# Patient Record
Sex: FEMALE | Race: ASIAN | NOT HISPANIC OR LATINO | Employment: OTHER | URBAN - METROPOLITAN AREA
[De-identification: names, ages, dates, MRNs, and addresses within clinical notes are randomized per-mention and may not be internally consistent; named-entity substitution may affect disease eponyms.]

---

## 2017-01-10 DIAGNOSIS — I10 ESSENTIAL HYPERTENSION: ICD-10-CM

## 2017-01-10 DIAGNOSIS — E78.6 HDL DEFICIENCY: ICD-10-CM

## 2017-01-10 RX ORDER — HYDROCHLOROTHIAZIDE 25 MG/1
25 TABLET ORAL
Qty: 90 TAB | Refills: 1 | Status: SHIPPED | OUTPATIENT
Start: 2017-01-10 | End: 2017-10-02 | Stop reason: SDUPTHER

## 2017-01-10 RX ORDER — LOVASTATIN 10 MG/1
10 TABLET ORAL DAILY
Qty: 90 TAB | Refills: 0 | Status: SHIPPED | OUTPATIENT
Start: 2017-01-10 | End: 2017-04-10 | Stop reason: SDUPTHER

## 2017-01-18 ENCOUNTER — OFFICE VISIT (OUTPATIENT)
Dept: MEDICAL GROUP | Facility: MEDICAL CENTER | Age: 82
End: 2017-01-18
Payer: MEDICARE

## 2017-01-18 VITALS
HEART RATE: 66 BPM | DIASTOLIC BLOOD PRESSURE: 68 MMHG | SYSTOLIC BLOOD PRESSURE: 142 MMHG | OXYGEN SATURATION: 96 % | BODY MASS INDEX: 29.3 KG/M2 | WEIGHT: 159.2 LBS | TEMPERATURE: 97.7 F | HEIGHT: 62 IN | RESPIRATION RATE: 14 BRPM

## 2017-01-18 DIAGNOSIS — I10 HTN (HYPERTENSION), BENIGN: ICD-10-CM

## 2017-01-18 DIAGNOSIS — E78.2 MIXED HYPERLIPIDEMIA: ICD-10-CM

## 2017-01-18 PROCEDURE — 99213 OFFICE O/P EST LOW 20 MIN: CPT | Performed by: INTERNAL MEDICINE

## 2017-01-18 RX ORDER — AMLODIPINE BESYLATE 5 MG/1
5 TABLET ORAL DAILY
Qty: 90 TAB | Refills: 3 | Status: SHIPPED | OUTPATIENT
Start: 2017-01-18 | End: 2018-02-28 | Stop reason: SDUPTHER

## 2017-01-18 NOTE — MR AVS SNAPSHOT
"        Tracy Baldwin   2017 1:00 PM   Office Visit   MRN: [id number]    Department:  [address] [place] [address]   Dept Phone:  [phone]    Description:  Female : 1935   Provider:  Hector Bah M.D.           Reason for Visit     Follow-Up 3 month check up      Allergies as of 2017     Allergen Noted Reactions    Nkda [No Known Drug Allergy] 2011         You were diagnosed with     HTN (hypertension), benign   [023181]         Vital Signs     Blood Pressure Pulse Temperature Respirations Height Weight    142/68 mmHg 66 36.5 °C (97.7 °F) 14 1.575 m (5' 2\") 72.213 kg (159 lb 3.2 oz)    Body Mass Index Oxygen Saturation Smoking Status             29.11 kg/m2 96% Current Every Day Smoker         Basic Information     Date Of Birth Sex Race Ethnicity Preferred Language    1935 Female  Unknown English      Your appointments     Mar 02, 2017  1:00 PM   NEW TO YOU with Brii Ortiz M.D.   [facility] [address]    [address] [place] [address]  [facility] [address]   [phone]              Problem List              ICD-10-CM Priority Class Noted - Resolved    HTN (hypertension), benign I10   3/14/2011 - Present    Hyperlipidemia E78.5   3/14/2011 - Present    Osteopenia M85.80   3/14/2011 - Present    DJD (degenerative joint disease) M19.90   3/14/2011 - Present    Multiple respiratory allergies J30.9   2014 - Present    Tobacco abuse Z72.0   2014 - Present      Health Maintenance        Date Due Completion Dates    PAP SMEAR 1958 ---    IMM ZOSTER VACCINE 1997 ---    MAMMOGRAM 2015 (Declined), 2012, 10/31/2011    Override on 2014: Patient Declined    IMM INFLUENZA (1) 2016    BONE DENSITY 2017 (Done), 2012    Override on 2012: Done    COLONOSCOPY 2018 (Done)    Override on 2008: Done    IMM DTaP/Tdap/Td Vaccine (2 - Td) 2022            "   Current Immunizations     13-VALENT PCV PREVNAR 4/20/2016    Influenza Vaccine Quad Inj (Preserved) 1/19/2015    Pneumococcal polysaccharide vaccine (PPSV-23) 10/1/2011    Tdap Vaccine 11/14/2012      Below and/or attached are the medications your provider expects you to take. Review all of your home medications and newly ordered medications with your provider and/or pharmacist. Follow medication instructions as directed by your provider and/or pharmacist. Please keep your medication list with you and share with your provider. Update the information when medications are discontinued, doses are changed, or new medications (including over-the-counter products) are added; and carry medication information at all times in the event of emergency situations     Allergies:  NKDA - (reactions not documented)               Medications  Valid as of: January 18, 2017 -  1:34 PM    Generic Name Brand Name Tablet Size Instructions for use    AmLODIPine Besylate (Tab) NORVASC 5 MG Take 1 Tab by mouth every day.        Aspirin (Tablet Delayed Response) aspirin 81 MG Take 81 mg by mouth.        Calcium Carb-Cholecalciferol (Tab) Calcium-Vitamin D 500-400 MG-UNIT Take 1 Tab by mouth 2 Times a Day.        Cetirizine HCl (Cap) Cetirizine HCl 10 MG Take 1 Capsule by mouth every day.        Cholecalciferol (Tab) cholecalciferol 1000 UNIT Take 1,000 Units by mouth every day.        Estradiol (Tab) VAGIFEM 10 MCG Use on tablet daily x2 weeks, then 1 tablet twice a week        Fluticasone Propionate (Suspension) FLONASE 50 MCG/ACT Spray 1 Spray in nose 2 times a day.        HydroCHLOROthiazide (Tab) HYDRODIURIL 25 MG Take 1 Tab by mouth every day. TAKE 1 TAB BY MOUTH EVERY DAY.        Lisinopril (Tab) PRINIVIL, ZESTRIL 40 MG Take 1 Tab by mouth every day. TAKE 1 TAB BY MOUTH EVERY DAY.        Lovastatin (Tab) MEVACOR 10 MG Take 1 Tab by mouth every day.        .                 Medicines prescribed today were sent to:     [facility]  #[id number] - [place], [place] - [address] [facility]    [address] [place] [address]    Phone: [phone] Fax: [phone]    Open 24 Hours?: No      Medication refill instructions:       If your prescription bottle indicates you have medication refills left, it is not necessary to call your provider’s office. Please contact your pharmacy and they will refill your medication.    If your prescription bottle indicates you do not have any refills left, you may request refills at any time through one of the following ways: The online [employer] system (except Urgent Care), by calling your provider’s office, or by asking your pharmacy to contact your provider’s office with a refill request. Medication refills are processed only during regular business hours and may not be available until the next business day. Your provider may request additional information or to have a follow-up visit with you prior to refilling your medication.   *Please Note: Medication refills are assigned a new Rx number when refilled electronically. Your pharmacy may indicate that no refills were authorized even though a new prescription for the same medication is available at the pharmacy. Please request the medicine by name with the pharmacy before contacting your provider for a refill.        Other Notes About Your Plan                  [employer] Access Code: [email or web address]  Expires: 2/17/2017  1:34 PM    [employer]  A secure, online tool to manage your health information     [employer]® is a secure, online tool that connects you to your personalized health information from the privacy of your home -- day or night - making it very easy for you to manage your healthcare. Once the activation process is completed, you can even access your medical information using the Starbucks tiffanie, which is available for free in the Apple Tiffanie store or Google Play store.     [employer] provides the following levels of access (as shown below):   My Chart Features    [facility] Doctor [facility]  Specialists [facility]  Urgent  Care Non-[facility]  Primary Care  Doctor   Email your healthcare team securely and privately 24/7 X X X    Manage appointments: schedule your next appointment; view details of past/upcoming appointments X      Request prescription refills. X      View recent personal medical records, including lab and immunizations X X X X   View health record, including health history, allergies, medications X X X X   Read reports about your outpatient visits, procedures, consult and ER notes X X X X   See your discharge summary, which is a recap of your hospital and/or ER visit that includes your diagnosis, lab results, and care plan. X X       How to register for [facility]  1. Go to  [email or web address].  2. Click on the Sign Up Now box, which takes you to the New Member Sign Up page. You will need to provide the following information:  a. Enter your [facility] Access Code exactly as it appears at the top of this page. (You will not need to use this code after you’ve completed the sign-up process. If you do not sign up before the expiration date, you must request a new code.)   b. Enter your date of birth.   c. Enter your home email address.   d. Click Submit, and follow the next screen’s instructions.  3. Create a [facility] ID. This will be your [facility] login ID and cannot be changed, so think of one that is secure and easy to remember.  4. Create a [facility] password. You can change your password at any time.  5. Enter your Password Reset Question and Answer. This can be used at a later time if you forget your password.   6. Enter your e-mail address. This allows you to receive e-mail notifications when new information is available in [facility]  7. Click Sign Up. You can now view your health information.    For assistance activating your [facility] account, call [phone]

## 2017-01-19 NOTE — PROGRESS NOTES
Tracy is a 81 y.o. female who is here today because    Chief Complaint   Patient presents with   • Follow-Up     3 month check up       The patient's current problem list and medication list is:    Patient Active Problem List    Diagnosis Date Noted   • Multiple respiratory allergies 05/07/2014   • Tobacco abuse 05/07/2014   • HTN (hypertension), benign 03/14/2011   • Hyperlipidemia 03/14/2011   • Osteopenia 03/14/2011   • DJD (degenerative joint disease) 03/14/2011       Current Outpatient Prescriptions   Medication Sig Dispense Refill   • amlodipine (NORVASC) 5 MG Tab Take 1 Tab by mouth every day. 90 Tab 3   • lovastatin (MEVACOR) 10 MG tablet Take 1 Tab by mouth every day. 90 Tab 0   • hydrochlorothiazide (HYDRODIURIL) 25 MG Tab Take 1 Tab by mouth every day. TAKE 1 TAB BY MOUTH EVERY DAY. 90 Tab 1   • lisinopril (PRINIVIL, ZESTRIL) 40 MG tablet Take 1 Tab by mouth every day. TAKE 1 TAB BY MOUTH EVERY DAY. 90 Tab 1   • fluticasone (FLONASE) 50 MCG/ACT nasal spray Spray 1 Spray in nose 2 times a day. 1 Bottle 1   • Cetirizine HCl 10 MG Cap Take 1 Capsule by mouth every day. 30 Cap 2   • estradiol (VAGIFEM) 10 MCG Tab Use on tablet daily x2 weeks, then 1 tablet twice a week 18 Tab 0   • vitamin D (CHOLECALCIFEROL) 1000 UNIT Tab Take 1,000 Units by mouth every day.     • aspirin (ASPIR-LOW) 81 MG EC tablet Take 81 mg by mouth.     • Calcium Carb-Cholecalciferol (CALCIUM-VITAMIN D) 500-400 MG-UNIT TABS Take 1 Tab by mouth 2 Times a Day.       No current facility-administered medications for this visit.     HPI  Tracy returns for 3 month follow up. Diagnoses of HTN (hypertension), benign and Mixed hyperlipidemia were pertinent to this visit.    1. HTN (hypertension), benign  No postural dizziness. No headache complaints. Good energy level. Compliant with medications.     2. Mixed hyperlipidemia  In August her lipid profile was in good range: Tri 94, HDL 50, LDL 87. She walks everywhere but not so much when cold  "and snowy. No chest pain, SOB or ankle edema.       ROS Denies chest pain, shortness of breath, changes in bowel and bladder function, lower extremity edema.    Allergies as of 01/18/2017 - Kevin as Reviewed 01/18/2017   Allergen Reaction Noted   • Nkda [no known drug allergy]  03/14/2011      /68 mmHg  Pulse 66  Temp(Src) 36.5 °C (97.7 °F)  Resp 14  Ht 1.575 m (5' 2\")  Wt 72.213 kg (159 lb 3.2 oz)  BMI 29.11 kg/m2  SpO2 96%    PHYSICAL EXAMINATION  Gen:         Alert and oriented, No apparent distress.  Neck:       No Jugular venous distension, Lymphadenopathy, Thyromegaly, Bruits.  Lungs:     Clear to auscultation bilaterally  CV:          Regular rate and rhythm. No murmur heard  Abd:         Soft, non distended.                     Ext:          No clubbing, cyanosis, edema.      ASSESSMENT AND PLAN     81 y.o. female     1. HTN (hypertension), benign  Adequately controlled with current medication(s); no indication for change.  - amlodipine (NORVASC) 5 MG Tab; Take 1 Tab by mouth every day.  Dispense: 90 Tab; Refill: 3    2. Mixed hyperlipidemia  Adequately controlled with current medication(s); no indication for change.      Followup: 3 months  "

## 2017-03-02 ENCOUNTER — OFFICE VISIT (OUTPATIENT)
Dept: MEDICAL GROUP | Facility: MEDICAL CENTER | Age: 82
End: 2017-03-02
Payer: MEDICARE

## 2017-03-02 VITALS
RESPIRATION RATE: 14 BRPM | HEIGHT: 62 IN | DIASTOLIC BLOOD PRESSURE: 64 MMHG | BODY MASS INDEX: 28.89 KG/M2 | WEIGHT: 157 LBS | TEMPERATURE: 97.8 F | SYSTOLIC BLOOD PRESSURE: 120 MMHG | OXYGEN SATURATION: 95 % | HEART RATE: 67 BPM

## 2017-03-02 DIAGNOSIS — I83.90 VARICOSE VEIN OF LEG: ICD-10-CM

## 2017-03-02 DIAGNOSIS — I10 HTN (HYPERTENSION), BENIGN: ICD-10-CM

## 2017-03-02 DIAGNOSIS — E78.5 HYPERLIPIDEMIA, UNSPECIFIED HYPERLIPIDEMIA TYPE: ICD-10-CM

## 2017-03-02 DIAGNOSIS — Z72.0 TOBACCO ABUSE: ICD-10-CM

## 2017-03-02 DIAGNOSIS — Z00.00 HEALTH CARE MAINTENANCE: ICD-10-CM

## 2017-03-02 DIAGNOSIS — M85.80 OSTEOPENIA: ICD-10-CM

## 2017-03-02 PROCEDURE — G8420 CALC BMI NORM PARAMETERS: HCPCS | Performed by: FAMILY MEDICINE

## 2017-03-02 PROCEDURE — 4004F PT TOBACCO SCREEN RCVD TLK: CPT | Performed by: FAMILY MEDICINE

## 2017-03-02 PROCEDURE — 1101F PT FALLS ASSESS-DOCD LE1/YR: CPT | Performed by: FAMILY MEDICINE

## 2017-03-02 PROCEDURE — G8484 FLU IMMUNIZE NO ADMIN: HCPCS | Performed by: FAMILY MEDICINE

## 2017-03-02 PROCEDURE — 99204 OFFICE O/P NEW MOD 45 MIN: CPT | Mod: 25 | Performed by: FAMILY MEDICINE

## 2017-03-02 PROCEDURE — 4040F PNEUMOC VAC/ADMIN/RCVD: CPT | Performed by: FAMILY MEDICINE

## 2017-03-02 NOTE — MR AVS SNAPSHOT
"        Tracy Denny   3/2/2017 1:00 PM   Office Visit   MRN: [id number]    Department:  [address] [place] [address]   Dept Phone:  [phone]    Description:  Female : 1935   Provider:  Brii Ortiz M.D.           Reason for Visit     Establish Care           Allergies as of 3/2/2017     Allergen Noted Reactions    Nkda [No Known Drug Allergy] 2011         You were diagnosed with     HTN (hypertension), benign   [525616]       Hyperlipidemia, unspecified hyperlipidemia type   [2039944]       Tobacco abuse   [896590]       Osteopenia   [398269]       Health care maintenance   [211881]       Varicose vein of leg   [388990]         Vital Signs     Blood Pressure Pulse Temperature Respirations Height Weight    120/64 mmHg 67 36.6 °C (97.8 °F) 14 1.575 m (5' 2.01\") 71.215 kg (157 lb)    Body Mass Index Oxygen Saturation Smoking Status             28.71 kg/m2 95% Current Every Day Smoker         Basic Information     Date Of Birth Sex Race Ethnicity Preferred Language    1935 Female  Unknown English      Your appointments     2017 11:00 AM   Established Patient with Brii Ortiz M.D.   [facility] [address])    [address] [place] [id number] [phone]           You will be receiving a confirmation call a few days before your appointment from our automated call confirmation system.              Problem List              ICD-10-CM Priority Class Noted - Resolved    HTN (hypertension), benign I10   3/14/2011 - Present    Hyperlipidemia E78.5   3/14/2011 - Present    Osteopenia M85.80   3/14/2011 - Present    DJD (degenerative joint disease) M19.90   3/14/2011 - Present    Multiple respiratory allergies J30.9   2014 - Present    Tobacco abuse Z72.0   2014 - Present      Health Maintenance        Date Due Completion Dates    IMM ZOSTER VACCINE 1997 ---    IMM INFLUENZA (1) 2016    BONE DENSITY 2017 " 11/29/2012 (Done), 11/29/2012    Override on 11/29/2012: Done    COLONOSCOPY 11/14/2018 11/14/2008 (Done)    Override on 11/14/2008: Done    IMM DTaP/Tdap/Td Vaccine (2 - Td) 11/14/2022 11/14/2012            Current Immunizations     13-VALENT PCV PREVNAR 4/20/2016    Influenza Vaccine Quad Inj (Preserved) 1/19/2015    Pneumococcal polysaccharide vaccine (PPSV-23) 10/1/2011    Tdap Vaccine 11/14/2012      Below and/or attached are the medications your provider expects you to take. Review all of your home medications and newly ordered medications with your provider and/or pharmacist. Follow medication instructions as directed by your provider and/or pharmacist. Please keep your medication list with you and share with your provider. Update the information when medications are discontinued, doses are changed, or new medications (including over-the-counter products) are added; and carry medication information at all times in the event of emergency situations     Allergies:  NKDA - (reactions not documented)               Medications  Valid as of: March 02, 2017 -  1:18 PM    Generic Name Brand Name Tablet Size Instructions for use    AmLODIPine Besylate (Tab) NORVASC 5 MG Take 1 Tab by mouth every day.        Aspirin (Tablet Delayed Response) aspirin 81 MG Take 81 mg by mouth.        Calcium Carb-Cholecalciferol (Tab) Calcium-Vitamin D 500-400 MG-UNIT Take 1 Tab by mouth 2 Times a Day.        Cetirizine HCl (Cap) Cetirizine HCl 10 MG Take 1 Capsule by mouth every day.        Cholecalciferol (Tab) cholecalciferol 1000 UNIT Take 1,000 Units by mouth every day.        Fluticasone Propionate (Suspension) FLONASE 50 MCG/ACT Spray 1 Spray in nose 2 times a day.        HydroCHLOROthiazide (Tab) HYDRODIURIL 25 MG Take 1 Tab by mouth every day. TAKE 1 TAB BY MOUTH EVERY DAY.        Lisinopril (Tab) PRINIVIL, ZESTRIL 40 MG Take 1 Tab by mouth every day. TAKE 1 TAB BY MOUTH EVERY DAY.        Lovastatin (Tab) MEVACOR 10 MG Take 1  Tab by mouth every day.        .                 Medicines prescribed today were sent to:     [facility]Y #[id number] - [place], [place] - [address] [facility] [address] [facility]    [address] [facility] [address] [place] [address]    Phone: [phone] Fax: [phone]    Open 24 Hours?: No      Medication refill instructions:       If your prescription bottle indicates you have medication refills left, it is not necessary to call your provider’s office. Please contact your pharmacy and they will refill your medication.    If your prescription bottle indicates you do not have any refills left, you may request refills at any time through one of the following ways: The online [employer] system (except Urgent Care), by calling your provider’s office, or by asking your pharmacy to contact your provider’s office with a refill request. Medication refills are processed only during regular business hours and may not be available until the next business day. Your provider may request additional information or to have a follow-up visit with you prior to refilling your medication.   *Please Note: Medication refills are assigned a new Rx number when refilled electronically. Your pharmacy may indicate that no refills were authorized even though a new prescription for the same medication is available at the pharmacy. Please request the medicine by name with the pharmacy before contacting your provider for a refill.        Other Notes About Your Plan                  [employer] Access Code: [email or web address]  Expires: 4/1/2017  1:18 PM    [employer]  A secure, online tool to manage your health information     [employer]’s [employer]® is a secure, online tool that connects you to your personalized health information from the privacy of your home -- day or night - making it very easy for you to manage your healthcare. Once the activation process is completed, you can even access your medical information using the [employer] tiffanie, which is available for free in the Apple Tiffanie store or  Google Play store.     [employer] provides the following levels of access (as shown below):   My Chart Features   [facility] Doctor [facility]  Specialists [facility]  Urgent  Care Non-[facility]  Primary Care  Doctor   Email your healthcare team securely and privately 24/7 X X X    Manage appointments: schedule your next appointment; view details of past/upcoming appointments X      Request prescription refills. X      View recent personal medical records, including lab and immunizations X X X X   View health record, including health history, allergies, medications X X X X   Read reports about your outpatient visits, procedures, consult and ER notes X X X X   See your discharge summary, which is a recap of your hospital and/or ER visit that includes your diagnosis, lab results, and care plan. X X       How to register for [employer]:  1. Go to  [email or web address].  2. Click on the Sign Up Now box, which takes you to the New Member Sign Up page. You will need to provide the following information:  a. Enter your [employer] Access Code exactly as it appears at the top of this page. (You will not need to use this code after you’ve completed the sign-up process. If you do not sign up before the expiration date, you must request a new code.)   b. Enter your date of birth.   c. Enter your home email address.   d. Click Submit, and follow the next screen’s instructions.  3. Create a [employer] ID. This will be your [employer] login ID and cannot be changed, so think of one that is secure and easy to remember.  4. Create a [employer] password. You can change your password at any time.  5. Enter your Password Reset Question and Answer. This can be used at a later time if you forget your password.   6. Enter your e-mail address. This allows you to receive e-mail notifications when new information is available in [employer].  7. Click Sign Up. You can now view your health information.    For assistance activating your [employer] account, call  [phone]

## 2017-03-02 NOTE — PROGRESS NOTES
CC: Establish a new PCP.    HPI:  Tracy presents today to establish a new PCP. Was a patient of Dr Bah.    Patient lives in a low income senior Citizen housings. Has been active, and independent with all ADLs. Has the following medical issues:    Hypertension, BP has been adequately controlled on current medication. Denies headache, chest pain, and SOB.She is currently on Amlodipine 5 mg, HCTZ 25 mg, and lisinopril 40 mg daily.No side effects.    Hyperlipidemia, she has been tolerating the statin. Denies muscle pain LFTs has been normal, she is currently on Lovastatin 10 mg daily.    She smokes about 5 cig a day for more than 10 yrs, days any breathing problems. Denies any cough, or SOB.No h/o asthma, or COPD. Patient is counseled about smoking cessation, currently not ready to quit, but she will continue cutting down on her smoking gradually.    Last bone density in 2012 showed osteopenia,denies any h/o fratures. Has been using otc vit D and calcium.    Patient has a chronic varicose veins of both legs, she under went a venous striping surgery many ears ago.Has been having chronic leg swelling, using compression socks.Denies lreg pain, and ulcers.    Flu shot, and pneumonia are UTD(had the flu shot this year at [facility])            Patient Active Problem List    Diagnosis Date Noted   • Multiple respiratory allergies 05/07/2014   • Tobacco abuse 05/07/2014   • HTN (hypertension), benign 03/14/2011   • Hyperlipidemia 03/14/2011   • Osteopenia 03/14/2011   • DJD (degenerative joint disease) 03/14/2011       Current Outpatient Prescriptions   Medication Sig Dispense Refill   • amlodipine (NORVASC) 5 MG Tab Take 1 Tab by mouth every day. 90 Tab 3   • lovastatin (MEVACOR) 10 MG tablet Take 1 Tab by mouth every day. 90 Tab 0   • hydrochlorothiazide (HYDRODIURIL) 25 MG Tab Take 1 Tab by mouth every day. TAKE 1 TAB BY MOUTH EVERY DAY. 90 Tab 1   • lisinopril (PRINIVIL, ZESTRIL) 40 MG tablet Take 1 Tab by mouth every day.  "TAKE 1 TAB BY MOUTH EVERY DAY. 90 Tab 1   • fluticasone (FLONASE) 50 MCG/ACT nasal spray Spray 1 Spray in nose 2 times a day. 1 Bottle 1   • Cetirizine HCl 10 MG Cap Take 1 Capsule by mouth every day. 30 Cap 2   • estradiol (VAGIFEM) 10 MCG Tab Use on tablet daily x2 weeks, then 1 tablet twice a week 18 Tab 0   • vitamin D (CHOLECALCIFEROL) 1000 UNIT Tab Take 1,000 Units by mouth every day.     • aspirin (ASPIR-LOW) 81 MG EC tablet Take 81 mg by mouth.     • Calcium Carb-Cholecalciferol (CALCIUM-VITAMIN D) 500-400 MG-UNIT TABS Take 1 Tab by mouth 2 Times a Day.       No current facility-administered medications for this visit.         Allergies as of 03/02/2017 - Kevin as Reviewed 03/02/2017   Allergen Reaction Noted   • Nkda [no known drug allergy]  03/14/2011        Social History     Social History   • Marital Status:      Spouse Name: N/A   • Number of Children: N/A   • Years of Education: N/A     Occupational History   • Not on file.     Social History Main Topics   • Smoking status: Current Every Day Smoker -- 0.25 packs/day for 10 years     Types: Cigarettes   • Smokeless tobacco: Never Used   • Alcohol Use: 0.5 oz/week     1 Glasses of wine per week   • Drug Use: No   • Sexual Activity: Not Currently     Other Topics Concern   • Not on file     Social History Narrative       Family History   Problem Relation Age of Onset   • Hypertension Maternal Grandmother    • Cancer Sister      Colon Cancer       Past Surgical History   Procedure Laterality Date   • Appendectomy     • Abdominal hysterectomy total         ROS:  Denies any Headache, Blurred Vision, Confusion Chest pain,  Shortness of breath,  Abdominal pain, Changes of bowel or bladder, Lower ext edema, Fevers, Nights sweats, Weight Changes, Focal weakness or numbness.  All other systems are negative.    /64 mmHg  Pulse 67  Temp(Src) 36.6 °C (97.8 °F)  Resp 14  Ht 1.575 m (5' 2.01\")  Wt 71.215 kg (157 lb)  BMI 28.71 kg/m2  SpO2 " 95%    Physical Exam:  Gen:         Alert and oriented, No apparent distress.  HEENT:   Perrla, TM clear,  Oralpharynx no erythema or exudates.  Neck:       No Jugular venous distension, Lymphadenopathy, Thyromegaly, Bruits.  Lungs:     Clear to auscultation bilaterally  CV:          Regular rate and rhythm. No murmurs, rubs or gallops.  Abd:         Soft non tender, non distended. Normal active bowel sounds. No hepatosplenomegaly, No pulsatile masses.  Ext:          No clubbing, no cyanosis, mild bilateral leg edema.      Assessment and Plan.   81 y.o. female     1. HTN (hypertension), benign  Has been adequately controlled on current medication. Denies headache, chest pain, and SOB.  Continue on Amlodipine 5 mg, HCTZ 25 mg, and lisinopril 40 mg daily.    2. Hyperlipidemia, unspecified hyperlipidemia type  He has been tolerating the statin. Denies muscle pain LFTs has been normal  Continue on Lovastatin 10 mg daily.    3. Tobacco abuse  Smokes about 5 cig a day for more than 10 yrs, days any breathing problems.   Patient is counseled about smoking cessation, currently not ready.    4. Osteopenia  Last bone density in 2012 showed osteopenia,  Continue on viut D and calcium.    5. Health care maintenance  Flu shot, and pneumonia are UTD(had the flu shot this year at [facility])    6. Varicose vein of leg  Chronic.Had a venous striping surgery many ears ago.Has been having chronic leg swelling, using compression socks.

## 2017-04-10 DIAGNOSIS — E78.6 HDL DEFICIENCY: ICD-10-CM

## 2017-04-10 RX ORDER — LOVASTATIN 10 MG/1
10 TABLET ORAL DAILY
Qty: 90 TAB | Refills: 0 | Status: SHIPPED | OUTPATIENT
Start: 2017-04-10 | End: 2017-07-03 | Stop reason: SDUPTHER

## 2017-06-06 DIAGNOSIS — Z01.810 PRE-OPERATIVE CARDIOVASCULAR EXAMINATION: ICD-10-CM

## 2017-06-06 LAB — EKG IMPRESSION: NORMAL

## 2017-06-06 PROCEDURE — 93005 ELECTROCARDIOGRAM TRACING: CPT

## 2017-06-06 PROCEDURE — 93010 ELECTROCARDIOGRAM REPORT: CPT | Performed by: INTERNAL MEDICINE

## 2017-06-13 ENCOUNTER — HOSPITAL ENCOUNTER (OUTPATIENT)
Facility: MEDICAL CENTER | Age: 82
End: 2017-06-13
Attending: OPHTHALMOLOGY | Admitting: OPHTHALMOLOGY
Payer: MEDICARE

## 2017-06-13 VITALS
RESPIRATION RATE: 16 BRPM | BODY MASS INDEX: 29.09 KG/M2 | HEART RATE: 62 BPM | WEIGHT: 154.1 LBS | TEMPERATURE: 97.6 F | OXYGEN SATURATION: 96 % | HEIGHT: 61 IN

## 2017-06-13 PROBLEM — H26.9 CATARACT: Status: ACTIVE | Noted: 2017-06-13

## 2017-06-13 PROCEDURE — 160002 HCHG RECOVERY MINUTES (STAT): Performed by: OPHTHALMOLOGY

## 2017-06-13 PROCEDURE — 160029 HCHG SURGERY MINUTES - 1ST 30 MINS LEVEL 4: Performed by: OPHTHALMOLOGY

## 2017-06-13 PROCEDURE — 500855 HCHG NEEDLE, IRRIG CYSTOTOME 27G: Performed by: OPHTHALMOLOGY

## 2017-06-13 PROCEDURE — 700111 HCHG RX REV CODE 636 W/ 250 OVERRIDE (IP)

## 2017-06-13 PROCEDURE — 501539 HCHG TIP, PHACO: Performed by: OPHTHALMOLOGY

## 2017-06-13 PROCEDURE — 160035 HCHG PACU - 1ST 60 MINS PHASE I: Performed by: OPHTHALMOLOGY

## 2017-06-13 PROCEDURE — 700101 HCHG RX REV CODE 250

## 2017-06-13 PROCEDURE — 502240 HCHG MISC OR SUPPLY RC 0272: Performed by: OPHTHALMOLOGY

## 2017-06-13 PROCEDURE — 160009 HCHG ANES TIME/MIN: Performed by: OPHTHALMOLOGY

## 2017-06-13 PROCEDURE — 501749 HCHG SHELL REV 276: Performed by: OPHTHALMOLOGY

## 2017-06-13 PROCEDURE — 160048 HCHG OR STATISTICAL LEVEL 1-5: Performed by: OPHTHALMOLOGY

## 2017-06-13 DEVICE — IMPLANTABLE DEVICE: Type: IMPLANTABLE DEVICE | Status: FUNCTIONAL

## 2017-06-13 RX ORDER — SODIUM CHLORIDE, SODIUM LACTATE, POTASSIUM CHLORIDE, CALCIUM CHLORIDE 600; 310; 30; 20 MG/100ML; MG/100ML; MG/100ML; MG/100ML
INJECTION, SOLUTION INTRAVENOUS CONTINUOUS
Status: DISCONTINUED | OUTPATIENT
Start: 2017-06-13 | End: 2017-06-13 | Stop reason: HOSPADM

## 2017-06-13 RX ORDER — KETOROLAC TROMETHAMINE 5 MG/ML
SOLUTION OPHTHALMIC
Status: DISCONTINUED
Start: 2017-06-13 | End: 2017-06-13 | Stop reason: HOSPADM

## 2017-06-13 RX ORDER — TETRACAINE HYDROCHLORIDE 5 MG/ML
SOLUTION OPHTHALMIC
Status: COMPLETED
Start: 2017-06-13 | End: 2017-06-13

## 2017-06-13 RX ORDER — MOXIFLOXACIN 5 MG/ML
SOLUTION/ DROPS OPHTHALMIC
Status: DISCONTINUED
Start: 2017-06-13 | End: 2017-06-13 | Stop reason: HOSPADM

## 2017-06-13 RX ORDER — PHENYLEPHRINE HYDROCHLORIDE 25 MG/ML
SOLUTION/ DROPS OPHTHALMIC
Status: COMPLETED
Start: 2017-06-13 | End: 2017-06-13

## 2017-06-13 RX ORDER — TROPICAMIDE 10 MG/ML
SOLUTION/ DROPS OPHTHALMIC
Status: COMPLETED
Start: 2017-06-13 | End: 2017-06-13

## 2017-06-13 RX ADMIN — TROPICAMIDE 1 DROP: 10 SOLUTION/ DROPS OPHTHALMIC at 12:25

## 2017-06-13 RX ADMIN — TETRACAINE HYDROCHLORIDE 1 DROP: 5 SOLUTION OPHTHALMIC at 12:25

## 2017-06-13 RX ADMIN — PHENYLEPHRINE HYDROCHLORIDE 1 DROP: 2.5 SOLUTION/ DROPS OPHTHALMIC at 12:25

## 2017-06-13 RX ADMIN — SODIUM CHLORIDE, SODIUM LACTATE, POTASSIUM CHLORIDE, CALCIUM CHLORIDE: 600; 310; 30; 20 INJECTION, SOLUTION INTRAVENOUS at 12:40

## 2017-06-13 ASSESSMENT — PAIN SCALES - GENERAL
PAINLEVEL_OUTOF10: 0

## 2017-06-13 NOTE — OR NURSING
1352: To PACU post left eye phaco w/ IOL. Pt is awake and alert. Denies pain or nausea.  1422: Home care instructions reviewed w/ pt and friend. Questions/concerns addressed. Meets criteria for discharge.

## 2017-06-13 NOTE — DISCHARGE INSTRUCTIONS
HOME CARE INSTRUCTIONS FOR CATARACT SURGERY    ACTIVITY: Rest and take it easy for the first 24 hours. We strongly suggest that a responsible adult remain with you during that time. It is normal to feel sleepy. We encourage you to not do anything that requires balance, judgment or coordination. Be extra careful when walking (with a dilated eye, it is easier to trip and fall).     FOR 24 HOURS, DO NOT:       Drive, operate machinery or run household appliances.        Drink beer or alcoholic beverages.        Make important decisions or sign legal documents.     DIET: To avoid nausea, slowly advance diet as tolerated, avoiding spicy or greasy foods for the first meal.     MEDICATIONS: Resume taking daily medication. You may take Tylenol for mild discomfort, if needed.     SURGICAL DRESSING: Eye shield as instructed by your doctor. Dark glasses should be worn while in the sunlight.     Follow your Physician's instruction Sheet. Eye Kit Given    A follow-up appointment is scheduled with your doctor tomorrow at _______ am/pm.     You should call 911 if you develop problems with breathing or chest pain.  You should CALL YOUR PHYSICIAN if you develop: Sharp stabbing pain or sudden change in vision in your operative eye. If you are unable to contact your doctor or the surgical center, you should go to the nearest emergency room or urgent care center.     Physician's telephone # Dr. Carter [phone]    If any questions arise, call your doctor. If your doctor is not available, please feel free to call Same Day Surgery at [phone]. You can also call the [facility] Hotline open 24 hours/day, 7 days/week and speak to a nurse at [phone] or [phone].     I acknowledge receipt and understanding of these Home Care Instructions.    ______________________________     _______________________________            Signature of Patient / Responsible Adult                                                       RN  Signature    A registered nurse may call you a few days after your surgery to see how you are doing.   You may also receive a survey in the mail within the next two weeks and we ask that you take a few moments to complete and return the survey. Our goal is to provide you with very good care and we value your comments. Thank you for choosing [facility].

## 2017-06-13 NOTE — OP REPORT
DATE OF SERVICE:  06/13/2017    DATE OF SURGERY:  06/13/2017.    PREOPERATIVE DIAGNOSIS:  Cataract, left eye.    POSTOPERATIVE DIAGNOSIS:  Cataract, left eye.    PROCEDURE:  Phacoemulsification with intraocular lens implant, left eye.    SURGEON:  Sarkis Carter MD.    ANESTHESIA:  Local MAC.    PROSTHETIC DEVICES:  Softec HD intraocular lens, 21.5 diopter, serial   #[id number].    INDICATIONS:  The patient has a visually significant cataract in the left eye.    DESCRIPTION OF OPERATION:  The patient was placed in the supine position on   the operating room table.  Appropriate anesthetic monitors were positioned.    The eye was prepped and draped in the usual sterile fashion for ocular surgery   using Betadine solution.  A wire lid speculum was positioned for exposure.  A   clear cornea temporal incision was made with a lani keratome and a   paracentesis was made with a lnai knife.  The anterior chamber was filled   with viscoelastic and a continuous curvilinear capsulorrhexis was made with   the capsulorrhexis forceps.  The lens was hydrodissected and the nucleus was   removed using the phacoemulsification handpiece and the cortex was aspirated   with the IA handpiece.  The capsular bag was filled with viscoelastic and the   intraocular lens was positioned into the capsular bag.  Residual viscoelastic   was aspirated from the anterior chamber, and the wound was hydrated with   saline solution producing a watertight closure.  The wire lid speculum was   removed and the patient was taken to the recovery room in stable condition.       ____________________________________     MD CHRISTINA LYN / NTS    DD:  06/13/2017 14:00:25  DT:  06/13/2017 16:37:09    D#:  [id number]  Job#:  [id number]

## 2017-06-13 NOTE — IP AVS SNAPSHOT
" Home Care Instructions                                                                                                                Name:Tracy Baldwin  Medical Record Number:[id number]  CSN: [id number]    YOB: 1935   Age: 81 y.o.  Sex: female  HT:1.549 m (5' 0.98\") WT: 69.9 kg (154 lb 1.6 oz)          Admit Date: 6/13/2017     Discharge Date:   Today's Date: 6/13/2017  Attending Doctor:  Sarkis Carter M.D.                  Allergies:  Nkda                Discharge Instructions       HOME CARE INSTRUCTIONS FOR CATARACT SURGERY    ACTIVITY: Rest and take it easy for the first 24 hours. We strongly suggest that a responsible adult remain with you during that time. It is normal to feel sleepy. We encourage you to not do anything that requires balance, judgment or coordination. Be extra careful when walking (with a dilated eye, it is easier to trip and fall).     FOR 24 HOURS, DO NOT:       Drive, operate machinery or run household appliances.        Drink beer or alcoholic beverages.        Make important decisions or sign legal documents.     DIET: To avoid nausea, slowly advance diet as tolerated, avoiding spicy or greasy foods for the first meal.     MEDICATIONS: Resume taking daily medication. You may take Tylenol for mild discomfort, if needed.     SURGICAL DRESSING: Eye shield as instructed by your doctor. Dark glasses should be worn while in the sunlight.     Follow your Physician's instruction Sheet. Eye Kit Given    A follow-up appointment is scheduled with your doctor tomorrow at _______ am/pm.     You should call 911 if you develop problems with breathing or chest pain.  You should CALL YOUR PHYSICIAN if you develop: Sharp stabbing pain or sudden change in vision in your operative eye. If you are unable to contact your doctor or the surgical center, you should go to the nearest emergency room or urgent care center.     Physician's telephone # Dr. Carter [phone]    If any questions " arise, call your doctor. If your doctor is not available, please feel free to call Same Day Surgery at [phone]. You can also call the Health Hotline open 24 hours/day, 7 days/week and speak to a nurse at [phone] or [phone].     I acknowledge receipt and understanding of these Home Care Instructions.    ______________________________     _______________________________            Signature of Patient / Responsible Adult                                                       RN Signature    A registered nurse may call you a few days after your surgery to see how you are doing.   You may also receive a survey in the mail within the next two weeks and we ask that you take a few moments to complete and return the survey. Our goal is to provide you with very good care and we value your comments. Thank you for choosing [facility].        Medication List      ASK your doctor about these medications        Instructions    Morning Afternoon Evening Bedtime    amlodipine 5 MG Tabs   Commonly known as:  NORVASC        Take 1 Tab by mouth every day.   Dose:  5 mg                        ASPIR-LOW 81 MG EC tablet   Generic drug:  aspirin        Take 81 mg by mouth every day.   Dose:  81 mg                        Cetirizine HCl 10 MG Caps        Take 1 Capsule by mouth every day.   Dose:  1 Capsule                        hydrochlorothiazide 25 MG Tabs   Commonly known as:  HYDRODIURIL        Take 1 Tab by mouth every day. TAKE 1 TAB BY MOUTH EVERY DAY.   Dose:  25 mg                        lisinopril 40 MG tablet   Commonly known as:  PRINIVIL, ZESTRIL        Take 1 Tab by mouth every day. TAKE 1 TAB BY MOUTH EVERY DAY.   Dose:  40 mg                        lovastatin 10 MG tablet   Commonly known as:  MEVACOR        Take 1 Tab by mouth every day.   Dose:  10 mg                                Medication Information     Above and/or attached are the medications your physician expects you to take  upon discharge. Review all of your home medications and newly ordered medications with your doctor and/or pharmacist. Follow medication instructions as directed by your doctor and/or pharmacist. Please keep your medication list with you and share with your physician. Update the information when medications are discontinued, doses are changed, or new medications (including over-the-counter products) are added; and carry medication information at all times in the event of emergency situations.        Resources     Quit Smoking / Tobacco Use:    I understand the use of any tobacco products increases my chance of suffering from future heart disease or stroke and could cause other illnesses which may shorten my life. Quitting the use of tobacco products is the single most important thing I can do to improve my health. For further information on smoking / tobacco cessation call a Toll Free Quit Line at 1-197.463.9097 (*[employer]) or 1-397.347.6049 (American [employer] Association) or you can access the web based program at www.[facility].org.    [place] Tobacco Users Help Line:  (880) 949-6137       Toll Free: 1-417.422.2831  Quit Tobacco Program [facility] (715)775-3736    Crisis Hotline:    [place] Crisis Hotline:  6-429-FNQOPLJ or 1-479.148.5952    [place] Crisis Hotline:    1-648.241.4507 or 372-082-2650    Discharge Survey:   Thank you for choosing [facility]. We hope we did everything we could to make your hospital stay a pleasant one. You may be receiving a survey and we would appreciate your time and participation in answering the questions. Your input is very valuable to us in our efforts to improve our service to our patients and their families.            Signatures     My signature on this form indicates that:    1. I acknowledge receipt and understanding of these Home Care Instruction.  2. My questions regarding this information have been answered to my satisfaction.  3. I have  formulated a plan with my discharge nurse to obtain my prescribed medications for home.    __________________________________      __________________________________                   Patient Signature                                 Guardian/Responsible Adult Signature      __________________________________                 __________       ________                       Nurse Signature                                               Date                 Time

## 2017-06-13 NOTE — IP AVS SNAPSHOT
6/13/2017    Tracy Baldwin  [address]  [place] [address]    Dear Tracy:    [facility] wants to ensure your discharge home is safe and you or your loved ones have had all of your questions answered regarding your care after you leave the hospital.    Below is a list of resources and contact information should you have any questions regarding your hospital stay, follow-up instructions, or active medical symptoms.    Questions or Concerns Regarding… Contact   Medical Questions Related to Your Discharge  (7 days a week, 8am-5pm) Contact a Nurse Care Coordinator   [phone]   Medical Questions Not Related to Your Discharge  (24 hours a day / 7 days a week)  Contact the Nurse Health Line   [phone]    Medications or Discharge Instructions Refer to your discharge packet   or contact your [facility] Primary Care Provider   [phone]   Follow-up Appointment(s) Schedule your appointment via [employer]   or contact Scheduling [phone]   Billing Review your statement via [employer]  or contact Billing [phone]   Medical Records Review your records via [employer]   or contact Medical Records [phone]     You may receive a telephone call within two days of discharge. This call is to make certain you understand your discharge instructions and have the opportunity to have any questions answered. You can also easily access your medical information, test results and upcoming appointments via the [employer] free online health management tool. You can learn more and sign up at [facility]/[employer]. For assistance setting up your [employer] account, please call [phone].    Once again, we want to ensure your discharge home is safe and that you have a clear understanding of any next steps in your care. If you have any questions or concerns, please do not hesitate to contact us, we are here for you. Thank you for choosing [facility] for your healthcare needs.    Sincerely,    Your [facility] Healthcare Team

## 2017-07-03 RX ORDER — LOVASTATIN 10 MG/1
TABLET ORAL
Qty: 90 TAB | Refills: 0 | Status: SHIPPED | OUTPATIENT
Start: 2017-07-03 | End: 2017-10-02 | Stop reason: SDUPTHER

## 2017-07-26 ENCOUNTER — OFFICE VISIT (OUTPATIENT)
Dept: MEDICAL GROUP | Facility: MEDICAL CENTER | Age: 82
End: 2017-07-26
Payer: MEDICARE

## 2017-07-26 ENCOUNTER — HOSPITAL ENCOUNTER (OUTPATIENT)
Dept: RADIOLOGY | Facility: MEDICAL CENTER | Age: 82
End: 2017-07-26
Attending: FAMILY MEDICINE
Payer: MEDICARE

## 2017-07-26 ENCOUNTER — HOSPITAL ENCOUNTER (OUTPATIENT)
Dept: LAB | Facility: MEDICAL CENTER | Age: 82
End: 2017-07-26
Attending: FAMILY MEDICINE
Payer: MEDICARE

## 2017-07-26 VITALS
WEIGHT: 151.24 LBS | RESPIRATION RATE: 14 BRPM | OXYGEN SATURATION: 96 % | TEMPERATURE: 98.2 F | HEIGHT: 61 IN | HEART RATE: 66 BPM | DIASTOLIC BLOOD PRESSURE: 62 MMHG | SYSTOLIC BLOOD PRESSURE: 118 MMHG | BODY MASS INDEX: 28.55 KG/M2

## 2017-07-26 DIAGNOSIS — M25.551 BILATERAL HIP PAIN: ICD-10-CM

## 2017-07-26 DIAGNOSIS — Z72.0 TOBACCO ABUSE: ICD-10-CM

## 2017-07-26 DIAGNOSIS — E78.5 HYPERLIPIDEMIA, UNSPECIFIED HYPERLIPIDEMIA TYPE: ICD-10-CM

## 2017-07-26 DIAGNOSIS — M25.552 BILATERAL HIP PAIN: ICD-10-CM

## 2017-07-26 DIAGNOSIS — I10 HTN (HYPERTENSION), BENIGN: ICD-10-CM

## 2017-07-26 DIAGNOSIS — M85.80 OSTEOPENIA, UNSPECIFIED LOCATION: ICD-10-CM

## 2017-07-26 LAB
ALBUMIN SERPL BCP-MCNC: 3.9 G/DL (ref 3.2–4.9)
ALBUMIN/GLOB SERPL: 1 G/DL
ALP SERPL-CCNC: 68 U/L (ref 30–99)
ALT SERPL-CCNC: 9 U/L (ref 2–50)
ANION GAP SERPL CALC-SCNC: 7 MMOL/L (ref 0–11.9)
AST SERPL-CCNC: 13 U/L (ref 12–45)
BASOPHILS # BLD AUTO: 0.5 % (ref 0–1.8)
BASOPHILS # BLD: 0.04 K/UL (ref 0–0.12)
BILIRUB SERPL-MCNC: 0.4 MG/DL (ref 0.1–1.5)
BUN SERPL-MCNC: 25 MG/DL (ref 8–22)
CALCIUM SERPL-MCNC: 9.7 MG/DL (ref 8.5–10.5)
CHLORIDE SERPL-SCNC: 105 MMOL/L (ref 96–112)
CHOLEST SERPL-MCNC: 174 MG/DL (ref 100–199)
CO2 SERPL-SCNC: 23 MMOL/L (ref 20–33)
CREAT SERPL-MCNC: 0.95 MG/DL (ref 0.5–1.4)
EOSINOPHIL # BLD AUTO: 0.23 K/UL (ref 0–0.51)
EOSINOPHIL NFR BLD: 3.1 % (ref 0–6.9)
ERYTHROCYTE [DISTWIDTH] IN BLOOD BY AUTOMATED COUNT: 44.5 FL (ref 35.9–50)
GFR SERPL CREATININE-BSD FRML MDRD: 57 ML/MIN/1.73 M 2
GLOBULIN SER CALC-MCNC: 4.1 G/DL (ref 1.9–3.5)
GLUCOSE SERPL-MCNC: 105 MG/DL (ref 65–99)
HCT VFR BLD AUTO: 38.5 % (ref 37–47)
HDLC SERPL-MCNC: 54 MG/DL
HGB BLD-MCNC: 12.5 G/DL (ref 12–16)
IMM GRANULOCYTES # BLD AUTO: 0.02 K/UL (ref 0–0.11)
IMM GRANULOCYTES NFR BLD AUTO: 0.3 % (ref 0–0.9)
LDLC SERPL CALC-MCNC: 94 MG/DL
LYMPHOCYTES # BLD AUTO: 2.17 K/UL (ref 1–4.8)
LYMPHOCYTES NFR BLD: 29.1 % (ref 22–41)
MCH RBC QN AUTO: 32.4 PG (ref 27–33)
MCHC RBC AUTO-ENTMCNC: 32.5 G/DL (ref 33.6–35)
MCV RBC AUTO: 99.7 FL (ref 81.4–97.8)
MONOCYTES # BLD AUTO: 0.46 K/UL (ref 0–0.85)
MONOCYTES NFR BLD AUTO: 6.2 % (ref 0–13.4)
NEUTROPHILS # BLD AUTO: 4.53 K/UL (ref 2–7.15)
NEUTROPHILS NFR BLD: 60.8 % (ref 44–72)
NRBC # BLD AUTO: 0 K/UL
NRBC BLD AUTO-RTO: 0 /100 WBC
PLATELET # BLD AUTO: 244 K/UL (ref 164–446)
PMV BLD AUTO: 9.9 FL (ref 9–12.9)
POTASSIUM SERPL-SCNC: 4.3 MMOL/L (ref 3.6–5.5)
PROT SERPL-MCNC: 8 G/DL (ref 6–8.2)
RBC # BLD AUTO: 3.86 M/UL (ref 4.2–5.4)
SODIUM SERPL-SCNC: 135 MMOL/L (ref 135–145)
TRIGL SERPL-MCNC: 128 MG/DL (ref 0–149)
TSH SERPL DL<=0.005 MIU/L-ACNC: 2.6 UIU/ML (ref 0.3–3.7)
WBC # BLD AUTO: 7.5 K/UL (ref 4.8–10.8)

## 2017-07-26 PROCEDURE — 99214 OFFICE O/P EST MOD 30 MIN: CPT | Performed by: FAMILY MEDICINE

## 2017-07-26 PROCEDURE — 73521 X-RAY EXAM HIPS BI 2 VIEWS: CPT

## 2017-07-26 NOTE — PROGRESS NOTES
CC: HTN, HLD, others.    HPI:   Tracy presents today multiple medical issues:    Hypertension, BP has been adequately controlled on current medication. Denies headache, chest pain, and SOB.She is currently on Amlodipine 5 mg, HCTZ 25 mg, and lisinopril 40 mg daily.No side effects.    Hyperlipidemia, she has been tolerating the statin. Denies muscle pain LFTs has been normal, she is currently on Lovastatin 10 mg daily.    She smokes about 4-5 cig a day for more than 10 yrs, denies cough and SOB, or any breathing problems. No h/o asthma, or COPD. Patient is counseled about smoking cessation, currently not ready to quit.    Has had bone density in 2012 showed osteopenia,denies any h/o fratures. Has been using otc vit D and calcium.    Patient has been having bilateral hip pain that started about 2 month, more when she walks, denies any h/o trauma or falling down, on back pain or leg pain.    Patient Active Problem List    Diagnosis Date Noted   • Cataract 06/13/2017   • Multiple respiratory allergies 05/07/2014   • Tobacco abuse 05/07/2014   • HTN (hypertension), benign 03/14/2011   • Hyperlipidemia 03/14/2011   • Osteopenia 03/14/2011   • DJD (degenerative joint disease) 03/14/2011       Current Outpatient Prescriptions   Medication Sig Dispense Refill   • lovastatin (MEVACOR) 10 MG tablet TAKE 1 TAB BY MOUTH EVERY DAY. 90 Tab 0   • amlodipine (NORVASC) 5 MG Tab Take 1 Tab by mouth every day. 90 Tab 3   • hydrochlorothiazide (HYDRODIURIL) 25 MG Tab Take 1 Tab by mouth every day. TAKE 1 TAB BY MOUTH EVERY DAY. 90 Tab 1   • lisinopril (PRINIVIL, ZESTRIL) 40 MG tablet Take 1 Tab by mouth every day. TAKE 1 TAB BY MOUTH EVERY DAY. 90 Tab 1   • aspirin (ASPIR-LOW) 81 MG EC tablet Take 81 mg by mouth every day.     • Cetirizine HCl 10 MG Cap Take 1 Capsule by mouth every day. 30 Cap 2     No current facility-administered medications for this visit.         Allergies as of 07/26/2017 - Kevin as Reviewed 07/26/2017   Allergen  "Reaction Noted   • Nkda [no known drug allergy]  03/14/2011        ROS: Denies any chest pain, Shortness of breath, Changes bowel or bladder, Lower extremity edema.    Physical Exam:  /62 mmHg  Pulse 66  Temp(Src) 36.8 °C (98.2 °F)  Resp 14  Ht 1.549 m (5' 0.98\")  Wt 68.6 kg (151 lb 3.8 oz)  BMI 28.59 kg/m2  SpO2 96%  Gen.: Well-developed, well-nourished, no apparent distress,pleasant and cooperative with the examination  Skin:  Warm and dry with good turgor. No rashes or suspicious lesions in visible areas  HEENT:Sinuses nontender with palpation, TMs clear, nares patent with pink mucosa and clear rhinorrhea,no septal deviation ,polyps or lesions. lips without lesions, oropharynx clear.  Neck: Trachea midline,no masses or adenopathy. No JVD.  Cor: Regular rate and rhythm without murmur, gallop or rub.  Lungs: Respirations unlabored.Clear to auscultation with equal breath sounds bilaterally. No wheezes, rhonchi.  Extremities: No cyanosis, clubbing or edema    Assessment and Plan.   81 y.o. female     1. HTN (hypertension), benign  Has been adequately controlled on current medication. Denies headache, chest pain, and SOB.  Continue on Amlodipine 5 mg, HCTZ 25 mg, and lisinopril 40 mg daily.    - CBC WITH DIFFERENTIAL; Future  - COMP METABOLIC PANEL; Future  - TSH; Future    2. Hyperlipidemia, unspecified hyperlipidemia type  He has been tolerating the statin. Denies muscle pain LFTs has been normal  Continue on Lovastatin 10 mg daily.    - LIPID PANEL  - TSH; Future    3. Osteopenia, unspecified location  Last bone density in 2012 showed osteopenia,will continue follow up the bone density.  Continue on viut D and calcium.    4. Tobacco abuse  Smokes about 5 cig a day for more than 10 yrs, days any breathing problems.    Patient is counseled about smoking cessation, currently not ready.    5. Bilateral hip pain  Probably osteoarthritis.   Continue on otc pain medication.  Will do an X ray for both hip " joints.    - DX-HIP-UNILATERAL-W/O PELVIS-2/3 VIEWS LEFT; Future  - DX-HIP-UNILATERAL-W/O PELVIS-2/3 VIEWS RIGHT; Future

## 2017-08-17 ENCOUNTER — PATIENT OUTREACH (OUTPATIENT)
Dept: HEALTH INFORMATION MANAGEMENT | Facility: OTHER | Age: 82
End: 2017-08-17

## 2017-08-17 NOTE — PROGRESS NOTES
Outcome: No Answer    WebIZ Checked & Epic Updated:  yes    [facility]Connect Verified: yes    Attempt # 1

## 2017-08-24 NOTE — PROGRESS NOTES
Attempt #:3    WebIZ Checked & Epic Updated: yes  HealthConnect Verified: yes  Verify PCP: yes    Communication Preference Obtained: yes     Review Care Team: yes    Annual Wellness Visit Scheduling  1. Scheduling Status:Scheduled       Care Gap Scheduling (Attempt to Schedule EACH Overdue Care Gap!)     Health Maintenance Due   Topic Date Due   • Annual Wellness Visit  Scheduled    • IMM INFLUENZA (1) Not available in office yet          [employer] Activation: sent activation code  [employer] Tiffanie: no  Virtual Visits: no  Opt In to Text Messages: no

## 2017-08-25 ENCOUNTER — TELEPHONE (OUTPATIENT)
Dept: MEDICAL GROUP | Facility: MEDICAL CENTER | Age: 82
End: 2017-08-25

## 2017-08-25 NOTE — TELEPHONE ENCOUNTER
Left message for patient to call back regarding AW[facility] pre-visit planning. Please transfer call to [phone].

## 2017-08-25 NOTE — TELEPHONE ENCOUNTER
Future Appointments       Provider Department Center    8/29/2017 11:00 AM Brii Ortiz M.D.; [place] [address] [place] [facility]    10/26/2017 10:20 AM Brii Ortiz M.D. [address] [place] [facility]          ANNUAL WELLNESS VISIT PRE-VISIT PLANNING     1.  Reviewed note from last office visit with PCP: YES Last office visit: 07/26/17    2.  If any orders were placed at last visit, do we have Results/Consult Notes?        •  Labs - Labs ordered, completed and results are in chart. 07/26/17       •  Imaging - Imaging ordered, completed and results are in chart. 07/26/17 DX Hip Bilat       •  Referrals - No referrals were ordered at last office visit.     3.  Immunizations were updated in [employer] using WebIZ?: Yes       •  WebIZ Recommendations:Patient is up to date on all vaccines       •  Is patient due for Tdap? NO       •  Is patient due for Shingles? NO     4.  Patient is due for the following Health Maintenance Topics:   Health Maintenance Due   Topic Date Due   • Annual Wellness Visit  SCHEDULED FOR 08/29/17   • IMM INFLUENZA (1) 09/01/2017       5.  Reviewed/Updated the following with patient:       •   Preferred Pharmacy? YES       •   Preferred Lab? YES       •   Medications? YES. Was Abstract Encounter opened and chart updated? YES       •   Social History? YES. Was Abstract Encounter opened and chart updated? YES       •   Family History? YES. Was Abstract Encounter opened and chart updated? YES    6.  Care Team Updated:       •   DME Company (gait device, O2, CPAP, etc.): YES       •   Other Specialists (eye doctor, derm, GYN, cardiology, endo, etc): YES       •   Last eye exam: 05/26/17    7. DPA/Advanced Directive:  Patient has Advanced Directive on file.     8.  Patient has the following Care Path diagnoses on Problem List:  NONE    9.  Specialty Comments was updated with diagnosis information provided by [facility]: YES no note    10.  Patient was advised: “This  is a free wellness visit. The provider will screen for medical conditions to help you stay healthy. If you have other concerns to address you may be asked to discuss these at a separate visit or there may be an additional fee.”     11.  Patient was informed to arrive 15 min prior to their scheduled appointment and bring in their medication bottles?  YES

## 2017-08-29 ENCOUNTER — OFFICE VISIT (OUTPATIENT)
Dept: MEDICAL GROUP | Facility: MEDICAL CENTER | Age: 82
End: 2017-08-29
Payer: MEDICARE

## 2017-08-29 VITALS
OXYGEN SATURATION: 95 % | HEART RATE: 64 BPM | DIASTOLIC BLOOD PRESSURE: 60 MMHG | TEMPERATURE: 98.5 F | WEIGHT: 152 LBS | SYSTOLIC BLOOD PRESSURE: 110 MMHG | BODY MASS INDEX: 28.7 KG/M2 | HEIGHT: 61 IN

## 2017-08-29 DIAGNOSIS — E78.5 HYPERLIPIDEMIA, UNSPECIFIED HYPERLIPIDEMIA TYPE: ICD-10-CM

## 2017-08-29 DIAGNOSIS — Z72.0 TOBACCO ABUSE: ICD-10-CM

## 2017-08-29 DIAGNOSIS — I10 HTN (HYPERTENSION), BENIGN: ICD-10-CM

## 2017-08-29 DIAGNOSIS — M85.80 OSTEOPENIA, UNSPECIFIED LOCATION: ICD-10-CM

## 2017-08-29 DIAGNOSIS — Z00.00 HEALTH CARE MAINTENANCE: ICD-10-CM

## 2017-08-29 DIAGNOSIS — J30.9 MULTIPLE RESPIRATORY ALLERGIES: ICD-10-CM

## 2017-08-29 PROCEDURE — G0439 PPPS, SUBSEQ VISIT: HCPCS | Performed by: FAMILY MEDICINE

## 2017-08-29 ASSESSMENT — PATIENT HEALTH QUESTIONNAIRE - PHQ9: CLINICAL INTERPRETATION OF PHQ2 SCORE: 0

## 2017-08-29 NOTE — PROGRESS NOTES
Chief Complaint   Patient presents with   • Annual Exam     AWV         HPI:  Tracy is a 82 y.o. here for Medicare Annual Wellness Visit        Patient Active Problem List    Diagnosis Date Noted   • Cataract 06/13/2017   • Multiple respiratory allergies 05/07/2014   • Tobacco abuse 05/07/2014   • HTN (hypertension), benign 03/14/2011   • Hyperlipidemia 03/14/2011   • Osteopenia 03/14/2011   • DJD (degenerative joint disease) 03/14/2011       Current Outpatient Prescriptions   Medication Sig Dispense Refill   • lovastatin (MEVACOR) 10 MG tablet TAKE 1 TAB BY MOUTH EVERY DAY. 90 Tab 0   • amlodipine (NORVASC) 5 MG Tab Take 1 Tab by mouth every day. 90 Tab 3   • hydrochlorothiazide (HYDRODIURIL) 25 MG Tab Take 1 Tab by mouth every day. TAKE 1 TAB BY MOUTH EVERY DAY. 90 Tab 1   • lisinopril (PRINIVIL, ZESTRIL) 40 MG tablet Take 1 Tab by mouth every day. TAKE 1 TAB BY MOUTH EVERY DAY. 90 Tab 1   • Cetirizine HCl 10 MG Cap Take 1 Capsule by mouth every day. 30 Cap 2   • aspirin (ASPIR-LOW) 81 MG EC tablet Take 81 mg by mouth every day.       No current facility-administered medications for this visit.         Current supplements as per medication list.     Allergies: Nkda [no known drug allergy]    Current social contact/activities: talk to neighbors     Is patient current with immunizations? Yes.    She  reports that she has been smoking Cigarettes.  She has a 14.50 pack-year smoking history. She has never used smokeless tobacco. She reports that she drinks about 0.5 oz of alcohol per week . She reports that she does not use drugs.  Ready to quit: Not Answered  Counseling given: Not Answered        DPA/Advanced directive: Patient has Advanced Directive on file.     ROS:    Gait: Uses no assistive device   Ostomy: no   Other tubes: no   Amputations: no   Chronic oxygen use no   Last eye exam 5/2017   Wears hearing aids: no   : Denies incontinence.         Depression Screening    Little interest or pleasure in doing  things?  0 - not at all  Feeling down, depressed, or hopeless? 0 - not at all  Patient Health Questionnaire Score: 0    If depressive symptoms identified deferred to follow up visit unless specifically addressed in assessment and plan.    Interpretation of PHQ-9 Total Score   Score Severity   1-4 No Depression   5-9 Mild Depression   10-14 Moderate Depression   15-19 Moderately Severe Depression   20-27 Severe Depression    Screening for Cognitive Impairment    Three Minute Recall (apple, watch, jason)  2/3    Draw clock face with all 12 numbers set to the hand to show 10 minutes past 11 o'clock  1 5/5  If cognitive concerns identified, deferred for follow up unless specifically addressed in assessment and plan.    Fall Risk Assessment    Has the patient had two or more falls in the last year or any fall with injury in the last year?  No  If fall risk identified, deferred for follow up unless specifically addressed in assessment and plan.    Safety Assessment    Throw rugs on floor.  Yes  Handrails on all stairs.  Yes  Good lighting in all hallways.  Yes  Difficulty hearing.  No  Patient counseled about all safety risks that were identified.    Functional Assessment ADLs    Are there any barriers preventing you from cooking for yourself or meeting nutritional needs?  No.    Are there any barriers preventing you from driving safely or obtaining transportation?  No.    Are there any barriers preventing you from using a telephone or calling for help?  No.    Are there any barriers preventing you from shopping?  No.    Are there any barriers preventing you from taking care of your own finances?  No.    Are there any barriers preventing you from managing your medications?  No.    Are you currently engaging any exercise or physical activity?  No.       Health Maintenance Summary                Annual Wellness Visit Overdue 10/16/2015      Done 10/15/2014     IMM INFLUENZA Next Due 9/1/2017      Done 1/19/2015 Imm Admin:  "Influenza Vaccine Quad Inj (Preserved)    BONE DENSITY Next Due 11/29/2017      Done 11/29/2012 DS-BONE DENSITY STUDY (DEXA)     Patient has more history with this topic...    COLONOSCOPY Next Due 11/14/2018      Done 11/14/2008     IMM DTaP/Tdap/Td Vaccine Next Due 11/14/2022      Done 11/14/2012 Imm Admin: Tdap Vaccine          Patient Care Team:  Brii Ortiz M.D. as PCP - General (Geriatrics)  Gabriela Andersen O.D. as Consulting Physician (Optometry)  Sarkis Carter M.D. as Consulting Physician (Ophthalmology)    Social History   Substance Use Topics   • Smoking status: Current Every Day Smoker     Packs/day: 0.25     Years: 58.00     Types: Cigarettes   • Smokeless tobacco: Never Used      Comment: Started smoking at age 24   • Alcohol use 0.5 oz/week     1 Glasses of wine per week     Family History   Problem Relation Age of Onset   • Cancer Mother    • No Known Problems Son    • Colon Cancer Sister    • No Known Problems Brother    • No Known Problems Sister    • Hypertension Maternal Grandmother    • No Known Problems Maternal Grandfather    • No Known Problems Paternal Grandmother    • No Known Problems Paternal Grandfather    • No Known Problems Son      She  has a past medical history of Arthritis; Cataract; Dental disorder; Environmental and seasonal allergies; Heart burn; Hyperlipidemia; Hypertension; Osteopenia; and Tobacco abuse. She also has no past medical history of Breast cancer (CMS-HCC).   Past Surgical History:   Procedure Laterality Date   • CATARACT PHACO WITH IOL Left 6/13/2017    Procedure: CATARACT PHACO WITH IOL;  Surgeon: Sarkis Carter M.D.;  Location: SURGERY SAME DAY [facility];  Service:    • ABDOMINAL HYSTERECTOMY TOTAL     • APPENDECTOMY     • CATARACT EXTRACTION WITH IOL Right            Exam:     Blood pressure 110/60, pulse 64, temperature 36.9 °C (98.5 °F), height 1.55 m (5' 1.02\"), weight 68.9 kg (152 lb), SpO2 95 %. Body mass index is 28.7 " kg/m².    Hearing , good    Dentition, good  Alert, oriented in no acute distress.  Eye contact is good, speech goal directed, affect calm      Assessment and Plan. The following treatment and monitoring plan is recommended:    82 y.o. female     1. HTN (hypertension), benign  Has been adequately controlled on current medication. Denies headache, chest pain, and SOB.  Continue on Amlodipine 5 mg, HCTZ 25 mg, and lisinopril 40 mg daily.     - Annual Wellness Visit - Includes PPPS Subsequent ()    2. Hyperlipidemia, unspecified hyperlipidemia type  He has been tolerating the statin. Denies muscle pain LFTs has been normal  Continue on Lovastatin 10 mg daily.     - Annual Wellness Visit - Includes PPPS Subsequent ()    3. Osteopenia, unspecified location  Last bone density in 2012 showed osteopenia,will continue follow up the bone density.  Continue on viut D and calcium.     - Annual Wellness Visit - Includes PPPS Subsequent ()    4. Tobacco abuse  Smokes about 5 cig a day for more than 10 yrs, days any breathing problems.    Patient is counseled about smoking cessation, currently not ready.    - Annual Wellness Visit - Includes PPPS Subsequent ()    5. Health care maintenance  Vaccinations are UTD'    - Annual Wellness Visit - Includes PPPS Subsequent ()    6. Multiple respiratory allergies  Has been doing fine on Cetirizine as needed.    - Annual Wellness Visit - Includes PPPS Subsequent ()    7. Annual well ness exam  Preventive measures, and chronic medical issues were reviewed.    Health Care Screening recommendations as per orders if indicated.  Referrals offered: PT/OT/Nutrition counseling/Behavioral Health/Smoking cessation as per orders if indicated.    Discussion today about general wellness and lifestyle habits:    · Prevent falls and reduce trip hazards; Cautioned about securing or removing rugs.  · Have a working fire alarm and carbon monoxide detector;   · Engage in regular  physical activity and social activities       Follow-up: 3 month

## 2017-10-02 ENCOUNTER — TELEPHONE (OUTPATIENT)
Dept: MEDICAL GROUP | Facility: MEDICAL CENTER | Age: 82
End: 2017-10-02

## 2017-10-02 DIAGNOSIS — E78.5 HYPERLIPIDEMIA, UNSPECIFIED HYPERLIPIDEMIA TYPE: ICD-10-CM

## 2017-10-02 DIAGNOSIS — I10 ESSENTIAL HYPERTENSION: ICD-10-CM

## 2017-10-02 RX ORDER — LOVASTATIN 10 MG/1
TABLET ORAL
Qty: 90 TAB | Refills: 1 | Status: SHIPPED | OUTPATIENT
Start: 2017-10-02 | End: 2018-09-25 | Stop reason: SDUPTHER

## 2017-10-02 RX ORDER — LISINOPRIL 40 MG/1
40 TABLET ORAL
Qty: 90 TAB | Refills: 1 | Status: SHIPPED | OUTPATIENT
Start: 2017-10-02 | End: 2018-03-29 | Stop reason: SDUPTHER

## 2017-10-02 RX ORDER — HYDROCHLOROTHIAZIDE 25 MG/1
25 TABLET ORAL
Qty: 90 TAB | Refills: 1 | Status: SHIPPED | OUTPATIENT
Start: 2017-10-02 | End: 2018-03-29 | Stop reason: SDUPTHER

## 2017-10-02 NOTE — TELEPHONE ENCOUNTER
Phone Number Called:[phone]  [facility]    Message: Called pharmacy to call in a Rx because it was refused as what Pharmacist and Pt Tracy has said. Tracy called ph. [phone]    Left Message for patient to call back: N/A

## 2017-11-29 ENCOUNTER — OFFICE VISIT (OUTPATIENT)
Dept: MEDICAL GROUP | Facility: MEDICAL CENTER | Age: 82
End: 2017-11-29
Payer: MEDICARE

## 2017-11-29 VITALS
DIASTOLIC BLOOD PRESSURE: 60 MMHG | HEIGHT: 61 IN | WEIGHT: 154.32 LBS | RESPIRATION RATE: 16 BRPM | BODY MASS INDEX: 29.14 KG/M2 | SYSTOLIC BLOOD PRESSURE: 132 MMHG | HEART RATE: 80 BPM | OXYGEN SATURATION: 97 % | TEMPERATURE: 97.6 F

## 2017-11-29 DIAGNOSIS — M85.80 OSTEOPENIA, UNSPECIFIED LOCATION: ICD-10-CM

## 2017-11-29 DIAGNOSIS — I10 HTN (HYPERTENSION), BENIGN: ICD-10-CM

## 2017-11-29 DIAGNOSIS — F17.200 TOBACCO DEPENDENCE: ICD-10-CM

## 2017-11-29 DIAGNOSIS — E78.2 MIXED HYPERLIPIDEMIA: ICD-10-CM

## 2017-11-29 DIAGNOSIS — R25.2 LEG CRAMP: ICD-10-CM

## 2017-11-29 PROCEDURE — 99214 OFFICE O/P EST MOD 30 MIN: CPT | Performed by: FAMILY MEDICINE

## 2017-11-29 NOTE — PROGRESS NOTES
CC: leg cramps, HTN, high cholesterol,     HPI:   Tracy presents today to discuss the following medical issues:    Has been having intermittent bilateral leg cramp specially if she walks for long time and some times at night, she stated when she gets it during the day , she drinks a lot of water and then it goes away.. Deniers any sleeping problems or leg pains during sleeping. Denies any claudication.    Hypertension, BP has been adequately controlled on current medication. Denies headache, chest pain, and SOB.She is currently on Amlodipine 5 mg, HCTZ 25 mg, and lisinopril 40 mg daily.No side effects.     Hyperlipidemia, she has been tolerating the statin. Denies muscle pain LFTs has been normal, she is currently on Lovastatin 10 mg daily.    She smokes about 4-5 cig a day for more than 10 yrs, denies cough and SOB, or any breathing problems. She stated that she is old to quit, she does not want to.     Has osteopenia( as per previous bone density scan) ,denies any h/o fratures. Has been using otc vit D and calcium.    Patient Active Problem List    Diagnosis Date Noted   • Cataract 06/13/2017   • Multiple respiratory allergies 05/07/2014   • Tobacco abuse 05/07/2014   • HTN (hypertension), benign 03/14/2011   • Hyperlipidemia 03/14/2011   • Osteopenia 03/14/2011   • DJD (degenerative joint disease) 03/14/2011       Current Outpatient Prescriptions   Medication Sig Dispense Refill   • lovastatin (MEVACOR) 10 MG tablet TAKE 1 TAB BY MOUTH EVERY DAY. 90 Tab 1   • hydrochlorothiazide (HYDRODIURIL) 25 MG Tab Take 1 Tab by mouth every day. TAKE 1 TAB BY MOUTH EVERY DAY. 90 Tab 1   • lisinopril (PRINIVIL, ZESTRIL) 40 MG tablet Take 1 Tab by mouth every day. 90 Tab 1   • amlodipine (NORVASC) 5 MG Tab Take 1 Tab by mouth every day. 90 Tab 3   • Cetirizine HCl 10 MG Cap Take 1 Capsule by mouth every day. 30 Cap 2   • aspirin (ASPIR-LOW) 81 MG EC tablet Take 81 mg by mouth every day.       No current facility-administered  "medications for this visit.          Allergies as of 11/29/2017 - Reviewed 11/29/2017   Allergen Reaction Noted   • Nkda [no known drug allergy]  03/14/2011        ROS: Denies any chest pain, Shortness of breath, Changes bowel or bladder, Lower extremity edema.    Physical Exam:  /60   Pulse 80   Temp 36.4 °C (97.6 °F)   Resp 16   Ht 1.55 m (5' 1.02\")   Wt 70 kg (154 lb 5.2 oz)   SpO2 97%   Breastfeeding? No   BMI 29.14 kg/m²   Gen.: Well-developed, well-nourished, no apparent distress,pleasant and cooperative with the examination  Skin:  Warm and dry with good turgor. No rashes or suspicious lesions in visible areas  HEENT:Sinuses nontender with palpation, TMs clear, nares patent with pink mucosa and clear rhinorrhea,no septal deviation ,polyps or lesions. lips without lesions, oropharynx clear.  Neck: Trachea midline,no masses or adenopathy. No JVD.  Cor: Regular rate and rhythm without murmur, gallop or rub.  Lungs: Respirations unlabored.Clear to auscultation with equal breath sounds bilaterally. No wheezes, rhonchi.  Extremities: No cyanosis, clubbing or edema.        Assessment and Plan.   82 y.o. female     1. Leg cramp  Probably dehydration .Has been having a normal electrolytes.  For now I recoomend hydration.    2. HTN (hypertension), benign  Has been adequately controlled on current medication. Denies headache, chest pain, and SOB.  Continue on Amlodipine 5 mg, HCTZ 25 mg, and lisinopril 40 mg daily.     3. Hyperlipidemia, unspecified hyperlipidemia type  He has been tolerating the statin. Denies muscle pain LFTs has been normal  Continue on Lovastatin 10 mg daily.     4. Osteopenia, unspecified location  Last bone density in 2012 showed osteopenia,will continue follow up the bone density.  Continue on viut D and calcium.     5. Tobacco dependence  Smokes about 5 cig a day for more than 10 yrs, days any breathing problems.    Patient is counseled about smoking cessation, currently not " ready.

## 2018-02-28 ENCOUNTER — TELEPHONE (OUTPATIENT)
Dept: MEDICAL GROUP | Facility: MEDICAL CENTER | Age: 83
End: 2018-02-28

## 2018-02-28 ENCOUNTER — PATIENT OUTREACH (OUTPATIENT)
Dept: HEALTH INFORMATION MANAGEMENT | Facility: OTHER | Age: 83
End: 2018-02-28

## 2018-02-28 DIAGNOSIS — M85.80 OSTEOPENIA, UNSPECIFIED LOCATION: ICD-10-CM

## 2018-02-28 DIAGNOSIS — I10 HTN (HYPERTENSION), BENIGN: ICD-10-CM

## 2018-02-28 RX ORDER — AMLODIPINE BESYLATE 5 MG/1
5 TABLET ORAL DAILY
Qty: 90 TAB | Refills: 1 | Status: SHIPPED | OUTPATIENT
Start: 2018-02-28 | End: 2018-09-03 | Stop reason: SDUPTHER

## 2018-03-01 NOTE — PROGRESS NOTES
Attempt #:Final    [facility] Verified: yes  Verify PCP: yes    Communication Preference Obtained: no// declined to go over Med/Fam history.    Annual Wellness Visit Scheduling  1. Scheduling Status:Scheduled     Care Gap Scheduling (Attempt to Schedule EACH Overdue Care Gap!)  Health Maintenance Due   Topic Date Due   • BONE DENSITY  11/29/2017  / Scheduled       MyChart Activation: declined

## 2018-03-15 ENCOUNTER — HOSPITAL ENCOUNTER (OUTPATIENT)
Dept: RADIOLOGY | Facility: MEDICAL CENTER | Age: 83
End: 2018-03-15
Attending: FAMILY MEDICINE
Payer: MEDICARE

## 2018-03-15 DIAGNOSIS — M85.80 OSTEOPENIA, UNSPECIFIED LOCATION: ICD-10-CM

## 2018-03-15 PROCEDURE — 77080 DXA BONE DENSITY AXIAL: CPT

## 2018-03-26 ENCOUNTER — TELEPHONE (OUTPATIENT)
Dept: MEDICAL GROUP | Facility: MEDICAL CENTER | Age: 83
End: 2018-03-26

## 2018-03-26 RX ORDER — ACETAMINOPHEN 160 MG
2000 TABLET,DISINTEGRATING ORAL
COMMUNITY

## 2018-03-26 NOTE — TELEPHONE ENCOUNTER
Future Appointments       Provider Department Center    3/27/2018 10:20 AM Brii Ortiz M.D.; [place]  [address] [place]    5/23/2018 11:20 AM Brii Ortiz M.D. [address]          ANNUAL WELLNESS VISIT PRE-VISIT PLANNING WITHOUT OUTREACH    1.  Reviewed note from last office visit with PCP: YES Last office visit 11/29/17    2.  If any orders were placed at last visit, do we have Results/Consult Notes?        •  Labs - Labs were not ordered at last office visit.       •  Imaging - Imaging ordered, completed and results are in chart. DEXA       •  Referrals - No referrals were ordered at last office visit.    3.  Immunizations were updated in [employer] using WebIZ?: Yes       •  WebIZ Recommendations: Patient is up to date on all vaccines        •  Is patient due for Tdap? NO       •  Is patient due for Shingles? NO     4.  Patient is due for the following Health Maintenance Topics:   There are no preventive care reminders to display for this patient.    5.  Reviewed/Updated the following with patient:       •   Preferred Pharmacy? YES       •   Preferred Lab? YES       •   Preferred Communication? YES       •   Allergies? YES       •   Medications? YES. Was Abstract Encounter opened and chart updated? YES       •   Social History? YES. Was Abstract Encounter opened and chart updated? YES       •   Family History (document living status of immediate family members and if + hx of  cancer, diabetes, hypertension, hyperlipidemia, heart attack, stroke) YES. Was Abstract Encounter opened and chart updated? YES    6.  Care Team Updated:       •   DME Company (gait device, O2, CPAP, etc.): YES       •   Other Specialists (eye doctor, derm, GYN, cardiology, endo, etc): YES    7.  Patient has the following Care Path diagnoses on Problem List:  NONE    8.  MDX printed and highlighted for Provider? YES    9.  Patient was advised: “This is a free wellness  visit. The provider will screen for medical conditions to help you stay healthy. If you have other concerns to address you may be asked to discuss these at a separate visit or there may be an additional fee.”     10.  Patient was informed to arrive 15 min prior to their scheduled appointment and bring in their medication bottles.

## 2018-03-27 ENCOUNTER — OFFICE VISIT (OUTPATIENT)
Dept: MEDICAL GROUP | Facility: MEDICAL CENTER | Age: 83
End: 2018-03-27
Payer: MEDICARE

## 2018-03-27 VITALS
HEART RATE: 73 BPM | OXYGEN SATURATION: 96 % | RESPIRATION RATE: 16 BRPM | BODY MASS INDEX: 28.32 KG/M2 | TEMPERATURE: 98.1 F | DIASTOLIC BLOOD PRESSURE: 62 MMHG | WEIGHT: 150 LBS | HEIGHT: 61 IN | SYSTOLIC BLOOD PRESSURE: 140 MMHG

## 2018-03-27 DIAGNOSIS — M85.80 OSTEOPENIA, UNSPECIFIED LOCATION: ICD-10-CM

## 2018-03-27 DIAGNOSIS — E78.2 MIXED HYPERLIPIDEMIA: ICD-10-CM

## 2018-03-27 DIAGNOSIS — Z72.0 TOBACCO ABUSE: ICD-10-CM

## 2018-03-27 DIAGNOSIS — I10 HTN (HYPERTENSION), BENIGN: ICD-10-CM

## 2018-03-27 DIAGNOSIS — Z00.00 MEDICARE ANNUAL WELLNESS VISIT, SUBSEQUENT: ICD-10-CM

## 2018-03-27 ASSESSMENT — PAIN SCALES - GENERAL: PAINLEVEL: 7=MODERATE-SEVERE PAIN

## 2018-03-27 ASSESSMENT — ACTIVITIES OF DAILY LIVING (ADL): BATHING_REQUIRES_ASSISTANCE: 0

## 2018-03-27 ASSESSMENT — PATIENT HEALTH QUESTIONNAIRE - PHQ9: CLINICAL INTERPRETATION OF PHQ2 SCORE: 0

## 2018-03-27 NOTE — PROGRESS NOTES
Chief Complaint   Patient presents with   • Annual Wellness Visit         HPI:  Tracy is a 82 y.o. here for Medicare Annual Wellness Visit    HTN (hypertension), benign  BP has been adequately controlled on current medication. Denies headache, chest pain, and SOB.has been on Lisinopril 40 mg, HCTZ 25 mg, and Amlodipine 5 mg daily.No side effects.    Mixed hyperlipidemia  She has been tolerating the statin. Denies muscle pain LFTs has been normal, Has been  on Lovastatin 10 mg daily    Osteopenia, unspecified location  Her last bone density scan was 3/2018.    Tobacco abuse  Smokes about 5 cig/day, discussed smoking cessation , patient is not ready now, but she will think about it.    Medicare annual wellness visit, subsequent  Preventive measures and chronic medical issues were re viewed.        Patient Active Problem List    Diagnosis Date Noted   • Cataract 06/13/2017   • Multiple respiratory allergies 05/07/2014   • Tobacco abuse 05/07/2014   • HTN (hypertension), benign 03/14/2011   • Hyperlipidemia 03/14/2011   • Osteopenia 03/14/2011   • DJD (degenerative joint disease) 03/14/2011       Current Outpatient Prescriptions   Medication Sig Dispense Refill   • Cholecalciferol (VITAMIN D3) 2000 UNIT Cap Take 2,000 Units by mouth.     • amLODIPine (NORVASC) 5 MG Tab TAKE 1 TAB BY MOUTH EVERY DAY. 90 Tab 1   • lovastatin (MEVACOR) 10 MG tablet TAKE 1 TAB BY MOUTH EVERY DAY. 90 Tab 1   • hydrochlorothiazide (HYDRODIURIL) 25 MG Tab Take 1 Tab by mouth every day. TAKE 1 TAB BY MOUTH EVERY DAY. 90 Tab 1   • lisinopril (PRINIVIL, ZESTRIL) 40 MG tablet Take 1 Tab by mouth every day. 90 Tab 1   • aspirin (ASPIR-LOW) 81 MG EC tablet Take 81 mg by mouth every day.     • Cetirizine HCl 10 MG Cap Take 1 Capsule by mouth every day. (Patient not taking: Reported on 3/26/2018) 30 Cap 2     No current facility-administered medications for this visit.         Patient is taking medications as noted in medication list.  Current  supplements as per medication list.     Allergies: Nkda [no known drug allergy]    Current social contact/activities: Visit with family and friends      Patient's perception of their health: fair    Is patient current with immunizations? Yes.    She  reports that she has been smoking Cigarettes.  She has a 14.75 pack-year smoking history. She has never used smokeless tobacco. She reports that she drinks about 0.5 oz of alcohol per week . She reports that she does not use drugs.  Ready to quit: Not Answered  Counseling given: Not Answered        DPA/Advanced directive: Patient has Advanced Directive on file.     ROS:    Gait: Uses no assistive device   Ostomy: no   Other tubes: no   Amputations: no   Chronic oxygen use no   Last eye exam 06/2017   Wears hearing aids: no   : Denies any urinary leakage during the last 6 months     Depression Screening    Little interest or pleasure in doing things?  0 - not at all  Feeling down, depressed, or hopeless? 0 - not at all  Patient Health Questionnaire Score: 0    If depressive symptoms identified deferred to follow up visit unless specifically addressed in assessment and plan.    Interpretation of PHQ-9 Total Score   Score Severity   1-4 No Depression   5-9 Mild Depression   10-14 Moderate Depression   15-19 Moderately Severe Depression   20-27 Severe Depression    Screening for Cognitive Impairment    Three Minute Recall (apple, watch, jason)   /3 Village, Kitchen, Baby  Draw clock face with all 12 numbers set to the hand to show 10 minutes past 11 o'clock  1 4/5 Time 3:40  If cognitive concerns identified, deferred for follow up unless specifically addressed in assessment and plan.    Fall Risk Assessment    Has the patient had two or more falls in the last year or any fall with injury in the last year?  No  If fall risk identified, deferred for follow up unless specifically addressed in assessment and plan.    Safety Assessment    Throw rugs on floor.  No  Handrails on  all stairs.  Yes  Good lighting in all hallways.  Yes  Difficulty hearing.  No  Patient counseled about all safety risks that were identified.    Functional Assessment ADLs    Are there any barriers preventing you from cooking for yourself or meeting nutritional needs?  No.    Are there any barriers preventing you from driving safely or obtaining transportation?  No.    Are there any barriers preventing you from using a telephone or calling for help?  No.    Are there any barriers preventing you from shopping?  No.    Are there any barriers preventing you from taking care of your own finances?  No.    Are there any barriers preventing you from managing your medications?  No.    Are there any barriers preventing you from showering, bathing or dressing yourself?  No.    Are you currently engaging any exercise or physical activity?  Yes.  PT reports walking     Health Maintenance Summary                Annual Wellness Visit Next Due 8/30/2018      Done 8/29/2017      Patient has more history with this topic...    COLONOSCOPY Next Due 11/14/2018      Done 11/14/2008     IMM DTaP/Tdap/Td Vaccine Next Due 11/14/2022      Done 11/14/2012 Imm Admin: Tdap Vaccine    BONE DENSITY Next Due 3/15/2023      Done 3/15/2018 DS-BONE DENSITY STUDY (DEXA)     Patient has more history with this topic...          Patient Care Team:  Brii Ortiz M.D. as PCP - General (Geriatrics)  Sarkis Carter M.D. as Consulting Physician (Ophthalmology)    Social History   Substance Use Topics   • Smoking status: Current Every Day Smoker     Packs/day: 0.25     Years: 59.00     Types: Cigarettes   • Smokeless tobacco: Never Used      Comment: Started smoking at age 24   • Alcohol use 0.5 oz/week     1 Glasses of wine per week     Family History   Problem Relation Age of Onset   • Cancer Mother      Female related cancer   • No Known Problems Father    • No Known Problems Son    • Colon Cancer Sister    • No Known Problems Brother    • Dementia  "Sister    • Hypertension Maternal Grandmother    • No Known Problems Maternal Grandfather    • No Known Problems Paternal Grandmother    • No Known Problems Paternal Grandfather    • No Known Problems Son      She  has a past medical history of Arthritis; Cataract; Dental disorder; Environmental and seasonal allergies; Heart burn; Hyperlipidemia; Hypertension; Osteopenia; and Tobacco abuse. She also has no past medical history of Breast cancer (CMS-[facility]).   Past Surgical History:   Procedure Laterality Date   • CATARACT PHACO WITH IOL Left 6/13/2017    Procedure: CATARACT PHACO WITH IOL;  Surgeon: Sarkis Carter M.D.;  Location: SURGERY SAME DAY [facility];  Service:    • ABDOMINAL HYSTERECTOMY TOTAL     • APPENDECTOMY     • CATARACT EXTRACTION WITH IOL Right            Exam:     Blood pressure 140/62, pulse 73, temperature 36.7 °C (98.1 °F), resp. rate 16, height 1.54 m (5' 0.63\"), weight 68 kg (150 lb), SpO2 96 %. Body mass index is 28.69 kg/m².    Hearing. good .    Dentition, good.   Alert, oriented in no acute distress.  Eye contact is good, speech goal directed, affect calm      Assessment and Plan. The following treatment and monitoring plan is recommended:    82 y.o. female     1. HTN (hypertension), benign  Has been adequately controlled on current medication. Denies headache, chest pain, and SOB.  Continue current medication.    - ANNUAL WELLNESS VISIT SUBSEQ    2. Mixed hyperlipidemia  He has been tolerating the statin. Denies muscle pain LFTs has been normal  Continue on Lovastatin    - ANNUAL WELLNESS VISIT SUBSEQ    3. Osteopenia, unspecified location  Last bone density scan was 3/2018.  Advised to take calcium( 600-1200 mg) and vit D ( 800-2000 units).    - ANNUAL WELLNESS VISIT SUBSEQ    4. Tobacco abuse  Smokes about 5 cig/day, discussed smoking cessation , patient is not ready now, but she will think about it.    - ANNUAL WELLNESS VISIT SUBSEQ    5. Medicare annual wellness visit, " subsequent  Preventive measures and chronic medical issues were re viewed.    - ANNUAL WELLNESS VISIT SUBSEQ          Services suggested:   Health Care Screening: Age-appropriate preventive services recommended by USPTF and ACIP covered by Medicare were discussed today. Services ordered if indicated and agreed upon by the patient.  Referrals offered: PT/OT/Nutrition counseling/Behavioral Health/Smoking cessation as per orders if indicated.    Discussion today about general wellness and lifestyle habits:    · Prevent falls and reduce trip hazards; Cautioned about securing or removing rugs.  · Have a working fire alarm and carbon monoxide detector;   · Engage in regular physical activity and social activities       Follow-up: No Follow-up on file.

## 2018-03-29 DIAGNOSIS — E78.5 HYPERLIPIDEMIA, UNSPECIFIED HYPERLIPIDEMIA TYPE: ICD-10-CM

## 2018-03-29 DIAGNOSIS — I10 ESSENTIAL HYPERTENSION: ICD-10-CM

## 2018-03-29 RX ORDER — HYDROCHLOROTHIAZIDE 25 MG/1
TABLET ORAL
Qty: 90 TAB | Refills: 1 | Status: SHIPPED | OUTPATIENT
Start: 2018-03-29 | End: 2018-09-25 | Stop reason: SDUPTHER

## 2018-03-29 RX ORDER — LISINOPRIL 40 MG/1
40 TABLET ORAL
Qty: 90 TAB | Refills: 1 | Status: SHIPPED | OUTPATIENT
Start: 2018-03-29 | End: 2018-09-25 | Stop reason: SDUPTHER

## 2018-07-18 ENCOUNTER — HOSPITAL ENCOUNTER (OUTPATIENT)
Dept: LAB | Facility: MEDICAL CENTER | Age: 83
End: 2018-07-18
Attending: FAMILY MEDICINE
Payer: MEDICARE

## 2018-07-18 ENCOUNTER — OFFICE VISIT (OUTPATIENT)
Dept: MEDICAL GROUP | Facility: MEDICAL CENTER | Age: 83
End: 2018-07-18
Payer: MEDICARE

## 2018-07-18 VITALS
TEMPERATURE: 97.6 F | HEIGHT: 60 IN | DIASTOLIC BLOOD PRESSURE: 72 MMHG | OXYGEN SATURATION: 97 % | SYSTOLIC BLOOD PRESSURE: 128 MMHG | WEIGHT: 149 LBS | HEART RATE: 69 BPM | RESPIRATION RATE: 16 BRPM | BODY MASS INDEX: 29.25 KG/M2

## 2018-07-18 DIAGNOSIS — E78.2 MIXED HYPERLIPIDEMIA: ICD-10-CM

## 2018-07-18 DIAGNOSIS — I10 HTN (HYPERTENSION), BENIGN: ICD-10-CM

## 2018-07-18 DIAGNOSIS — F17.200 TOBACCO DEPENDENCE: ICD-10-CM

## 2018-07-18 DIAGNOSIS — Z12.11 COLON CANCER SCREENING: ICD-10-CM

## 2018-07-18 DIAGNOSIS — M85.80 OSTEOPENIA, UNSPECIFIED LOCATION: ICD-10-CM

## 2018-07-18 LAB
ALBUMIN SERPL BCP-MCNC: 4.2 G/DL (ref 3.2–4.9)
ALBUMIN/GLOB SERPL: 1.1 G/DL
ALP SERPL-CCNC: 61 U/L (ref 30–99)
ALT SERPL-CCNC: 13 U/L (ref 2–50)
ANION GAP SERPL CALC-SCNC: 7 MMOL/L (ref 0–11.9)
AST SERPL-CCNC: 16 U/L (ref 12–45)
BASOPHILS # BLD AUTO: 0.7 % (ref 0–1.8)
BASOPHILS # BLD: 0.04 K/UL (ref 0–0.12)
BILIRUB SERPL-MCNC: 0.4 MG/DL (ref 0.1–1.5)
BUN SERPL-MCNC: 24 MG/DL (ref 8–22)
CALCIUM SERPL-MCNC: 9.9 MG/DL (ref 8.5–10.5)
CHLORIDE SERPL-SCNC: 105 MMOL/L (ref 96–112)
CHOLEST SERPL-MCNC: 179 MG/DL (ref 100–199)
CO2 SERPL-SCNC: 25 MMOL/L (ref 20–33)
CREAT SERPL-MCNC: 0.75 MG/DL (ref 0.5–1.4)
EOSINOPHIL # BLD AUTO: 0.09 K/UL (ref 0–0.51)
EOSINOPHIL NFR BLD: 1.5 % (ref 0–6.9)
ERYTHROCYTE [DISTWIDTH] IN BLOOD BY AUTOMATED COUNT: 48.7 FL (ref 35.9–50)
GLOBULIN SER CALC-MCNC: 3.9 G/DL (ref 1.9–3.5)
GLUCOSE SERPL-MCNC: 107 MG/DL (ref 65–99)
HCT VFR BLD AUTO: 39.5 % (ref 37–47)
HDLC SERPL-MCNC: 64 MG/DL
HGB BLD-MCNC: 12.6 G/DL (ref 12–16)
IMM GRANULOCYTES # BLD AUTO: 0.02 K/UL (ref 0–0.11)
IMM GRANULOCYTES NFR BLD AUTO: 0.3 % (ref 0–0.9)
LDLC SERPL CALC-MCNC: 95 MG/DL
LYMPHOCYTES # BLD AUTO: 2.13 K/UL (ref 1–4.8)
LYMPHOCYTES NFR BLD: 36 % (ref 22–41)
MCH RBC QN AUTO: 32.7 PG (ref 27–33)
MCHC RBC AUTO-ENTMCNC: 31.9 G/DL (ref 33.6–35)
MCV RBC AUTO: 102.6 FL (ref 81.4–97.8)
MONOCYTES # BLD AUTO: 0.37 K/UL (ref 0–0.85)
MONOCYTES NFR BLD AUTO: 6.3 % (ref 0–13.4)
NEUTROPHILS # BLD AUTO: 3.27 K/UL (ref 2–7.15)
NEUTROPHILS NFR BLD: 55.2 % (ref 44–72)
NRBC # BLD AUTO: 0 K/UL
NRBC BLD-RTO: 0 /100 WBC
PLATELET # BLD AUTO: 211 K/UL (ref 164–446)
PMV BLD AUTO: 10.4 FL (ref 9–12.9)
POTASSIUM SERPL-SCNC: 4.4 MMOL/L (ref 3.6–5.5)
PROT SERPL-MCNC: 8.1 G/DL (ref 6–8.2)
RBC # BLD AUTO: 3.85 M/UL (ref 4.2–5.4)
SODIUM SERPL-SCNC: 137 MMOL/L (ref 135–145)
TRIGL SERPL-MCNC: 98 MG/DL (ref 0–149)
TSH SERPL DL<=0.005 MIU/L-ACNC: 1.46 UIU/ML (ref 0.38–5.33)
WBC # BLD AUTO: 5.9 K/UL (ref 4.8–10.8)

## 2018-07-18 PROCEDURE — 80061 LIPID PANEL: CPT

## 2018-07-18 PROCEDURE — 99214 OFFICE O/P EST MOD 30 MIN: CPT | Performed by: FAMILY MEDICINE

## 2018-07-18 PROCEDURE — 84443 ASSAY THYROID STIM HORMONE: CPT

## 2018-07-18 PROCEDURE — 85025 COMPLETE CBC W/AUTO DIFF WBC: CPT

## 2018-07-18 PROCEDURE — 80053 COMPREHEN METABOLIC PANEL: CPT

## 2018-07-18 PROCEDURE — 36415 COLL VENOUS BLD VENIPUNCTURE: CPT

## 2018-07-24 ENCOUNTER — HOSPITAL ENCOUNTER (OUTPATIENT)
Facility: MEDICAL CENTER | Age: 83
End: 2018-07-24
Attending: FAMILY MEDICINE
Payer: MEDICARE

## 2018-07-24 PROCEDURE — 82274 ASSAY TEST FOR BLOOD FECAL: CPT

## 2018-07-31 DIAGNOSIS — Z12.11 COLON CANCER SCREENING: ICD-10-CM

## 2018-08-01 LAB — HEMOCCULT STL QL IA: NEGATIVE

## 2018-09-03 DIAGNOSIS — I10 HTN (HYPERTENSION), BENIGN: ICD-10-CM

## 2018-09-04 RX ORDER — AMLODIPINE BESYLATE 5 MG/1
5 TABLET ORAL DAILY
Qty: 90 TAB | Refills: 3 | Status: SHIPPED | OUTPATIENT
Start: 2018-09-04

## 2018-09-25 DIAGNOSIS — E78.5 HYPERLIPIDEMIA, UNSPECIFIED HYPERLIPIDEMIA TYPE: ICD-10-CM

## 2018-09-25 DIAGNOSIS — I10 ESSENTIAL HYPERTENSION: ICD-10-CM

## 2018-09-25 RX ORDER — LISINOPRIL 40 MG/1
TABLET ORAL
Qty: 90 TAB | Refills: 1 | Status: SHIPPED | OUTPATIENT
Start: 2018-09-25 | End: 2019-03-25 | Stop reason: SDUPTHER

## 2018-09-25 RX ORDER — LOVASTATIN 10 MG/1
TABLET ORAL
Qty: 90 TAB | Refills: 1 | Status: SHIPPED | OUTPATIENT
Start: 2018-09-25 | End: 2019-03-25 | Stop reason: SDUPTHER

## 2018-09-25 RX ORDER — HYDROCHLOROTHIAZIDE 25 MG/1
TABLET ORAL
Qty: 90 TAB | Refills: 1 | Status: SHIPPED | OUTPATIENT
Start: 2018-09-25

## 2018-10-18 ENCOUNTER — OFFICE VISIT (OUTPATIENT)
Dept: MEDICAL GROUP | Facility: MEDICAL CENTER | Age: 83
End: 2018-10-18
Payer: MEDICARE

## 2018-10-18 VITALS
TEMPERATURE: 98.7 F | OXYGEN SATURATION: 95 % | RESPIRATION RATE: 16 BRPM | HEIGHT: 60 IN | DIASTOLIC BLOOD PRESSURE: 66 MMHG | WEIGHT: 149 LBS | SYSTOLIC BLOOD PRESSURE: 130 MMHG | HEART RATE: 70 BPM | BODY MASS INDEX: 29.25 KG/M2

## 2018-10-18 DIAGNOSIS — R73.9 HYPERGLYCEMIA: ICD-10-CM

## 2018-10-18 DIAGNOSIS — F17.200 TOBACCO DEPENDENCE: ICD-10-CM

## 2018-10-18 DIAGNOSIS — M85.80 OSTEOPENIA, UNSPECIFIED LOCATION: ICD-10-CM

## 2018-10-18 DIAGNOSIS — E78.2 MIXED HYPERLIPIDEMIA: ICD-10-CM

## 2018-10-18 DIAGNOSIS — L98.9 SKIN LESION: ICD-10-CM

## 2018-10-18 DIAGNOSIS — J30.9 MULTIPLE RESPIRATORY ALLERGIES: ICD-10-CM

## 2018-10-18 DIAGNOSIS — I10 HTN (HYPERTENSION), BENIGN: ICD-10-CM

## 2018-10-18 PROCEDURE — 99214 OFFICE O/P EST MOD 30 MIN: CPT | Performed by: FAMILY MEDICINE

## 2018-10-18 NOTE — PROGRESS NOTES
CC: Hypertension, hyperlipidemia, osteopenia, multiple allergies, hyperglycemia, skin lesion.    HPI:   Tracy presents today to discuss a follow-up.     HTN (hypertension), benign  BP has been adequately controlled on current medication. Denies headache, chest pain, and SOB.Has been on lisinopril 40 mg, HCTZ 25 mg daily.     Osteopenia, unspecified location  Patient has been asymptomatic. Denies bone, and muscle pain. Her last Bone density was done in 3/2018.Has on Calcium and vit D.    Multiple respiratory allergies   patient has been having sneezing , sometimes stuffy nose and itchy eyes for many years. Denies chest pain, cough, shortness of breath. Denies history of asthma.Zyrtec as needed has been working for her.     Mixed hyperlipidemia  He has been tolerating the statin. Denies muscle pain LFTs has been normal, has been on Lovastatin 10 mg daily.      Tobacco dependence  Smokes 3-5 cig/day, discussed smoking cessation, stated that she already cut to the lowest number of cigarettes she can do.However denies cough and SOB.     Hyperglycemia  Blood sugar is slightly elevated however no history of diabetes. Denies polyuria polydipsia.    Skin lesion  She has been having skin lesion on the right temporal area. The lesion has been there for 7 months. Small pimple, dry, and itchy. Patient stated that she has been picking on it continuously. Lately she noticed the lesion becomes slightly bigger. Denies any bleeding from the lesion.      Patient Active Problem List    Diagnosis Date Noted   • Cataract 06/13/2017   • Multiple respiratory allergies 05/07/2014   • Tobacco abuse 05/07/2014   • HTN (hypertension), benign 03/14/2011   • Hyperlipidemia 03/14/2011   • Osteopenia 03/14/2011   • DJD (degenerative joint disease) 03/14/2011       Current Outpatient Prescriptions   Medication Sig Dispense Refill   • lisinopril (PRINIVIL, ZESTRIL) 40 MG tablet TAKE 1 TABLET BY MOUTH EVERY DAY 90 Tab 1   • hydroCHLOROthiazide  (HYDRODIURIL) 25 MG Tab TAKE 1 TABLET BY MOUTH EVERY DAY 90 Tab 1   • lovastatin (MEVACOR) 10 MG tablet TAKE 1 TAB BY MOUTH EVERY DAY. 90 Tab 1   • amLODIPine (NORVASC) 5 MG Tab TAKE 1 TAB BY MOUTH EVERY DAY. 90 Tab 3   • Cholecalciferol (VITAMIN D3) 2000 UNIT Cap Take 2,000 Units by mouth.     • aspirin (ASPIR-LOW) 81 MG EC tablet Take 81 mg by mouth every day.     • Cetirizine HCl 10 MG Cap Take 1 Capsule by mouth every day. (Patient not taking: Reported on 3/26/2018) 30 Cap 2     No current facility-administered medications for this visit.          Allergies as of 10/18/2018 - Reviewed 10/18/2018   Allergen Reaction Noted   • Nkda [no known drug allergy]  03/14/2011        ROS: Denies any chest pain, Shortness of breath, Changes bowel or bladder, Lower extremity edema.    Physical Exam:  /66 (BP Location: Left arm)   Pulse 70   Temp 37.1 °C (98.7 °F)   Resp 16   Ht 1.524 m (5')   Wt 67.6 kg (149 lb)   SpO2 95%   BMI 29.10 kg/m²   Gen.: Well-developed, well-nourished, no apparent distress,pleasant and cooperative with the examination  Skin:  Warm and dry with good turgor. Small, 0.5 cm pimple, scaly, gray in color.  HEENT:Sinuses nontender with palpation, TMs clear, nares patent with pink mucosa and clear rhinorrhea,no septal deviation ,polyps or lesions. lips without lesions, oropharynx clear.  Neck: Trachea midline,no masses or adenopathy. No JVD.  Cor: Regular rate and rhythm without murmur, gallop or rub.  Lungs: Respirations unlabored.Clear to auscultation with equal breath sounds bilaterally. No wheezes, rhonchi.  Extremities: No cyanosis, clubbing or edema.      Assessment and Plan.   83 y.o. female     1. HTN (hypertension), benign  Has been adequately controlled on current medication. Denies headache, chest pain, and SOB.  Continue on lisinopril 40 mg, HCTZ 25 mg daily.    2. Osteopenia, unspecified location  Last Bone density was done in 3/2018.  Asymptomatic.  Continue on Calcium and vit  OSCAR.    3. Multiple respiratory allergies  Zyrtec as needed has been working for her.     4. Mixed hyperlipidemia  He has been tolerating the statin. Denies muscle pain LFTs has been normal  Continue on Lovastatin 10 mg daily    5. Tobacco dependence  Smokes 3-5 cig/day, discussed smoking cessation, stated that she already cut to the lowest number of cigarettes she can do.However denies cough and SOB.    6. Hyperglycemia  Slightly high, BG is 107, asymptomatic.  Patient is counseled about life style modification.    7. Skin lesion  R/O BCC.    - REFERRAL TO DERMATOLOGY

## 2018-11-28 ENCOUNTER — APPOINTMENT (RX ONLY)
Dept: URBAN - METROPOLITAN AREA CLINIC 20 | Facility: CLINIC | Age: 83
Setting detail: DERMATOLOGY
End: 2018-11-28

## 2018-11-28 DIAGNOSIS — L82.0 INFLAMED SEBORRHEIC KERATOSIS: ICD-10-CM

## 2018-11-28 PROBLEM — D48.5 NEOPLASM OF UNCERTAIN BEHAVIOR OF SKIN: Status: ACTIVE | Noted: 2018-11-28

## 2018-11-28 PROBLEM — D23.4 OTHER BENIGN NEOPLASM OF SKIN OF SCALP AND NECK: Status: ACTIVE | Noted: 2018-11-28

## 2018-11-28 PROBLEM — I10 ESSENTIAL (PRIMARY) HYPERTENSION: Status: ACTIVE | Noted: 2018-11-28

## 2018-11-28 PROCEDURE — ? COUNSELING

## 2018-11-28 PROCEDURE — 11100: CPT

## 2018-11-28 PROCEDURE — 99201: CPT | Mod: 25

## 2018-11-28 PROCEDURE — ? BIOPSY BY SHAVE METHOD

## 2018-11-28 ASSESSMENT — LOCATION DETAILED DESCRIPTION DERM: LOCATION DETAILED: RIGHT LATERAL ZYGOMA

## 2018-11-28 ASSESSMENT — LOCATION SIMPLE DESCRIPTION DERM: LOCATION SIMPLE: RIGHT ZYGOMA

## 2018-11-28 ASSESSMENT — LOCATION ZONE DERM: LOCATION ZONE: FACE

## 2018-11-28 NOTE — PROCEDURE: BIOPSY BY SHAVE METHOD
Post-Care Instructions: I reviewed with the patient in detail post-care instructions. Patient is to keep the biopsy site clean once daily and then apply petroleum and bandaid  until healed.
Type Of Destruction Used: Curettage
Anesthesia Volume In Cc: 1
X Size Of Lesion In Cm: 0.4
Biopsy Type: H and E
Billing Type: Third-Party Bill
Hemostasis: Drysol and Electrocautery
Biopsy Method: Personna blade
Was A Bandage Applied: Yes
Notification Instructions: Patient will be notified of biopsy results. However, patient instructed to call the office if not contacted within 2 weeks.
Dressing: Band-Aid
Destruction After The Procedure: No
Anesthesia Type: 1% lidocaine with 1:100,000 epinephrine and a 1:12 solution of 8.4% sodium bicarbonate
Additional Anesthesia Volume In Cc (Will Not Render If 0): 0
Consent: Written consent was obtained and risks were reviewed including but not limited to scarring, infection, bleeding, scabbing, incomplete removal, nerve damage and allergy to anesthesia.
Detail Level: Detailed
Lab: [id number]
Depth Of Biopsy: dermis
Wound Care: Bacitracin
Lab Facility: 
Size Of Lesion In Cm: 0.7

## 2019-03-25 DIAGNOSIS — E78.5 HYPERLIPIDEMIA, UNSPECIFIED HYPERLIPIDEMIA TYPE: ICD-10-CM

## 2019-03-25 RX ORDER — LISINOPRIL 40 MG/1
TABLET ORAL
Qty: 90 TAB | Refills: 0 | Status: SHIPPED | OUTPATIENT
Start: 2019-03-25

## 2019-03-25 RX ORDER — LOVASTATIN 10 MG/1
TABLET ORAL
Qty: 90 TAB | Refills: 0 | Status: SHIPPED | OUTPATIENT
Start: 2019-03-25

## 2019-08-15 NOTE — PROGRESS NOTES
CC: HTN, HLD, Osteopenia.    HPI:   Tracy presents today to discuss the following medical issues:    HTN (hypertension), benign  BP has been adequately controlled on current medication. Denies headache, chest pain, and SOB.Has been on lisinopril 40 mg, HCTZ 25 mg daily.    Mixed hyperlipidemia  He has been tolerating the statin. Denies muscle pain LFTs has been normal, has been on Lovastatin 10 mg daily.    Osteopenia, unspecified location  Her last Bone density was done in 3/2018.Patient asymptomatic.Has on Calcium and vit D.    Tobacco dependence  Smokes 3-5 cig/day, discussed smoking cessation, stated that she already cut to the lowest number of cigarettes she can do.However denies cough and SOB.    Patient Active Problem List    Diagnosis Date Noted   • Cataract 06/13/2017   • Multiple respiratory allergies 05/07/2014   • Tobacco abuse 05/07/2014   • HTN (hypertension), benign 03/14/2011   • Hyperlipidemia 03/14/2011   • Osteopenia 03/14/2011   • DJD (degenerative joint disease) 03/14/2011       Current Outpatient Prescriptions   Medication Sig Dispense Refill   • hydroCHLOROthiazide (HYDRODIURIL) 25 MG Tab TAKE 1 TABLET BY MOUTH EVERY DAY 90 Tab 1   • lisinopril (PRINIVIL, ZESTRIL) 40 MG tablet TAKE 1 TAB BY MOUTH EVERY DAY. 90 Tab 1   • Cholecalciferol (VITAMIN D3) 2000 UNIT Cap Take 2,000 Units by mouth.     • amLODIPine (NORVASC) 5 MG Tab TAKE 1 TAB BY MOUTH EVERY DAY. 90 Tab 1   • lovastatin (MEVACOR) 10 MG tablet TAKE 1 TAB BY MOUTH EVERY DAY. 90 Tab 1   • aspirin (ASPIR-LOW) 81 MG EC tablet Take 81 mg by mouth every day.     • Cetirizine HCl 10 MG Cap Take 1 Capsule by mouth every day. (Patient not taking: Reported on 3/26/2018) 30 Cap 2     No current facility-administered medications for this visit.          Allergies as of 07/18/2018 - Reviewed 07/18/2018   Allergen Reaction Noted   • Nkda [no known drug allergy]  03/14/2011        ROS: Denies any chest pain, Shortness of breath, Changes bowel or  Upon Nutritional Assessment by the Registered Dietitian your patient was determined to meet criteria / has evidence of the following diagnosis/diagnoses:          [ ]  Mild Protein Calorie Malnutrition        [ ]  Moderate Protein Calorie Malnutrition        [x ] Severe Protein Calorie Malnutrition        [ ] Unspecified Protein Calorie Malnutrition        [ ] Underweight / BMI <19        [ ] Morbid Obesity / BMI > 40      Findings as based on:  [x ] Comprehensive nutrition assessment   [ ] Nutrition Focused Physical Exam  [ ] Other:       Nutrition Plan/Recommendations:  currently NPO, if EN recommend Vital 1.2 80cc/hr x 18 hrs        PROVIDER Section:     By signing this assessment you are acknowledging and agree with the diagnosis/diagnoses assigned by the Registered Dietitian    Comments: bladder, Lower extremity edema.    Physical Exam:  /72   Pulse 69   Temp 36.4 °C (97.6 °F)   Resp 16   Ht 1.524 m (5')   Wt 67.6 kg (149 lb)   SpO2 97%   BMI 29.10 kg/m²   Gen.: Well-developed, well-nourished, no apparent distress,pleasant and cooperative with the examination  Skin:  Warm and dry with good turgor. No rashes or suspicious lesions in visible areas  HEENT:Sinuses nontender with palpation, TMs clear, nares patent with pink mucosa and clear rhinorrhea,no septal deviation ,polyps or lesions. lips without lesions, oropharynx clear.  Neck: Trachea midline,no masses or adenopathy. No JVD.  Cor: Regular rate and rhythm without murmur, gallop or rub.  Lungs: Respirations unlabored.Clear to auscultation with equal breath sounds bilaterally. No wheezes, rhonchi.  Extremities: No cyanosis, clubbing or edema.        Assessment and Plan.   82 y.o. female     1. HTN (hypertension), benign  Has been adequately controlled on current medication. Denies headache, chest pain, and SOB.  Continue on lisinopril 40 mg, HCTZ 25 mg daily.    - CBC WITH DIFFERENTIAL; Future  - COMP METABOLIC PANEL; Future    2. Mixed hyperlipidemia  He has been tolerating the statin. Denies muscle pain LFTs has been normal  Continue on Lovastatin 10 mg daily.    - LIPID PANEL  - TSH; Future    3. Osteopenia, unspecified location  Last Bone density was done in 3/2018.  Asymptomatic.  Continue on Calcium and vit D.    4. Tobacco dependence  Smokes 3-5 cig/day, discussed smoking cessation, stated that she already cut to the lowest number of cigarettes she can do.However denies cough and SOB.

## 2022-12-10 NOTE — MR AVS SNAPSHOT
"        Tracygeraldine Baldwin   2017 11:00 AM   Office Visit   MRN: [id number]    Department:  [address] [place] [address]   Dept Phone:  [phone]    Description:  Female : 1935   Provider:  Brii Ortiz M.D.           Reason for Visit     Follow-Up 4 month check up, needs lab orders.    Hip Pain bilateral hip pain, difficulty walking x2 months.      Allergies as of 2017     Allergen Noted Reactions    Nkda [No Known Drug Allergy] 2011         You were diagnosed with     HTN (hypertension), benign   [212911]       Hyperlipidemia, unspecified hyperlipidemia type   [5311947]       Osteopenia, unspecified location   [3171755]       Tobacco abuse   [414392]       Bilateral hip pain   [145110]         Vital Signs     Blood Pressure Pulse Temperature Respirations Height Weight    118/62 mmHg 66 36.8 °C (98.2 °F) 14 1.549 m (5' 0.98\") 68.6 kg (151 lb 3.8 oz)    Body Mass Index Oxygen Saturation Smoking Status             28.59 kg/m2 96% Current Every Day Smoker         Basic Information     Date Of Birth Sex Race Ethnicity Preferred Language    1935 Female  Non- English      Your appointments     Oct 26, 2017 10:20 AM   Established Patient with Brii Ortiz M.D.   [place] [address] [place] ([facility])    [address] [place] [id number]   [phone]           You will be receiving a confirmation call a few days before your appointment from our automated call confirmation system.              Problem List              ICD-10-CM Priority Class Noted - Resolved    HTN (hypertension), benign I10   3/14/2011 - Present    Hyperlipidemia E78.5   3/14/2011 - Present    Osteopenia M85.80   3/14/2011 - Present    DJD (degenerative joint disease) M19.90   3/14/2011 - Present    Multiple respiratory allergies J30.9   2014 - Present    Tobacco abuse Z72.0   2014 - Present    Cataract H26.9   2017 - Present      Health Maintenance        Date Due " Completion Dates    IMM ZOSTER VACCINE 8/29/1997 ---    IMM INFLUENZA (1) 9/1/2017 1/19/2015    BONE DENSITY 11/29/2017 11/29/2012 (Done), 11/29/2012    Override on 11/29/2012: Done    COLONOSCOPY 11/14/2018 11/14/2008 (Done)    Override on 11/14/2008: Done    IMM DTaP/Tdap/Td Vaccine (2 - Td) 11/14/2022 11/14/2012            Current Immunizations     13-VALENT PCV PREVNAR 4/20/2016    Influenza Vaccine Quad Inj (Preserved) 1/19/2015    Pneumococcal polysaccharide vaccine (PPSV-23) 10/1/2011    Tdap Vaccine 11/14/2012      Below and/or attached are the medications your provider expects you to take. Review all of your home medications and newly ordered medications with your provider and/or pharmacist. Follow medication instructions as directed by your provider and/or pharmacist. Please keep your medication list with you and share with your provider. Update the information when medications are discontinued, doses are changed, or new medications (including over-the-counter products) are added; and carry medication information at all times in the event of emergency situations     Allergies:  NKDA - (reactions not documented)               Medications  Valid as of: July 26, 2017 - 11:24 AM    Generic Name Brand Name Tablet Size Instructions for use    AmLODIPine Besylate (Tab) NORVASC 5 MG Take 1 Tab by mouth every day.        Aspirin (Tablet Delayed Response) aspirin 81 MG Take 81 mg by mouth every day.        Cetirizine HCl (Cap) Cetirizine HCl 10 MG Take 1 Capsule by mouth every day.        HydroCHLOROthiazide (Tab) HYDRODIURIL 25 MG Take 1 Tab by mouth every day. TAKE 1 TAB BY MOUTH EVERY DAY.        Lisinopril (Tab) PRINIVIL, ZESTRIL 40 MG Take 1 Tab by mouth every day. TAKE 1 TAB BY MOUTH EVERY DAY.        Lovastatin (Tab) MEVACOR 10 MG TAKE 1 TAB BY MOUTH EVERY DAY.        .                 Medicines prescribed today were sent to:     [facility] #[id number] - [place], [place] - [address]    [address]  [place] [address]    Phone: [phone] Fax: [phone]    Open 24 Hours?: No      Medication refill instructions:       If your prescription bottle indicates you have medication refills left, it is not necessary to call your provider’s office. Please contact your pharmacy and they will refill your medication.    If your prescription bottle indicates you do not have any refills left, you may request refills at any time through one of the following ways: The online [email or web address] system (except Urgent Care), by calling your provider’s office, or by asking your pharmacy to contact your provider’s office with a refill request. Medication refills are processed only during regular business hours and may not be available until the next business day. Your provider may request additional information or to have a follow-up visit with you prior to refilling your medication.   *Please Note: Medication refills are assigned a new Rx number when refilled electronically. Your pharmacy may indicate that no refills were authorized even though a new prescription for the same medication is available at the pharmacy. Please request the medicine by name with the pharmacy before contacting your provider for a refill.        Your To Do List     Future Labs/Procedures Complete By Expires    CBC WITH DIFFERENTIAL  As directed 7/26/2018    COMP METABOLIC PANEL  As directed 7/26/2018    DX-HIP-UNILATERAL-W/O PELVIS-2/3 VIEWS LEFT  As directed 7/26/2018    DX-HIP-UNILATERAL-W/O PELVIS-2/3 VIEWS RIGHT  As directed 7/26/2018    TSH  As directed 7/27/2018      Other Notes About Your Plan                  [email or web address] Access Code: [email or web address]  Expires: 8/25/2017 11:24 AM    Biz[employer]  A secure, online tool to manage your health information     [employer]’s Biz[employer]® is a secure, online tool that connects you to your personalized health information from the privacy of your home -- day or night - making it very easy for you to manage your healthcare. Once  the activation process is completed, you can even access your medical information using the Opicos tiffanie, which is available for free in the Apple Tiffanie store or Google Play store.     Opicos provides the following levels of access (as shown below):   My Chart Features   [facility] Doctor [facility]  Specialists [facility]  Urgent  Care [facility]  Primary Care  Doctor   Email your healthcare team securely and privately 24/7 X X X    Manage appointments: schedule your next appointment; view details of past/upcoming appointments X      Request prescription refills. X      View recent personal medical records, including lab and immunizations X X X X   View health record, including health history, allergies, medications X X X X   Read reports about your outpatient visits, procedures, consult and ER notes X X X X   See your discharge summary, which is a recap of your hospital and/or ER visit that includes your diagnosis, lab results, and care plan. X X       How to register for [employer]:  1. Go to  [email or web address].  2. Click on the Sign Up Now box, which takes you to the New Member Sign Up page. You will need to provide the following information:  a. Enter your Opicos Access Code exactly as it appears at the top of this page. (You will not need to use this code after you’ve completed the sign-up process. If you do not sign up before the expiration date, you must request a new code.)   b. Enter your date of birth.   c. Enter your home email address.   d. Click Submit, and follow the next screen’s instructions.  3. Create a Opicos ID. This will be your Opicos login ID and cannot be changed, so think of one that is secure and easy to remember.  4. Create a Opicos password. You can change your password at any time.  5. Enter your Password Reset Question and Answer. This can be used at a later time if you forget your password.   6. Enter your e-mail address. This allows you to receive e-mail notifications when  new information is available in [employer].  7. Click Sign Up. You can now view your health information.    For assistance activating your [employer] account, call [phone]        Quit Tobacco Information     Do you want to quit using tobacco?    Quitting tobacco decreases risks of cancer, heart and lung disease, increases life expectancy, improves sense of taste and smell, and increases spending money, among other benefits.    If you are thinking about quitting, we can help.  • [facility] Quit Tobacco Program: [phone]  o Program occurs weekly for four weeks and includes pharmacist consultation on products to support quitting smoking or chewing tobacco. A provider referral is needed for pharmacist consultation.  • Tobacco Users Help Hotline: 1-064-QUIT-NOW (431-9219) or https://[place].quitlogix.org/  o Free, confidential telephone and online coaching for [place] residents. Sessions are designed on a schedule that is convenient for you. Eligible clients receive free nicotine replacement therapy.  • Nationally: www.smokefree.gov  o Information and professional assistance to support both immediate and long-term needs as you become, and remain, a non-smoker. Smokefree.gov allows you to choose the help that best fits your needs.         calm

## 2024-08-19 ENCOUNTER — DOCUMENTATION (OUTPATIENT)
Dept: HEALTH INFORMATION MANAGEMENT | Facility: OTHER | Age: 89
End: 2024-08-19

## (undated) DEVICE — KIT, EYE POST-OP FOR PHACOS

## (undated) DEVICE — CATHETER IV 20 GA X 1-1/4 ---SURG.& SDS ONLY--- (50EA/BX)

## (undated) DEVICE — NEEDLE FILTER ASPIRATION 18 GA X 1 1/2 IN (100EA/BX)

## (undated) DEVICE — GLOVE BIOGEL SZ 8 SURGICAL PF LTX - (50PR/BX 4BX/CA)

## (undated) DEVICE — CANNULA DIVIDED ADULT CO2 - SAMPLE W/FEMALE CONNCT (25/CA)

## (undated) DEVICE — LEAD SET 6 DISP. EKG NIHON KOHDEN (100EA/CA)

## (undated) DEVICE — GLOVE BIOGEL M SZ 8 SURGICAL PF LTX - (50/BX 4BX/CA)

## (undated) DEVICE — CANNULA INJECTION 27G (EYE) - 10/BX ALCON

## (undated) DEVICE — TIP MINI FLARE 30 DEGREE OZIL - (6/BX)

## (undated) DEVICE — KIT  I.V. START (100EA/CA)

## (undated) DEVICE — NEEDLE CYSTOTOME OPTH VSTC  0.4MM X 16MM - (10/CA)

## (undated) DEVICE — TUBING CLEARLINK DUO-VENT - C-FLO (48EA/CA)

## (undated) DEVICE — KNIFE MICROSURGICAL 15 DEGREE GREEN

## (undated) DEVICE — ELECTRODE 850 FOAM ADHESIVE - HYDROGEL RADIOTRNSPRNT (50/PK)

## (undated) DEVICE — BLADE 3.0MM ANGLED DBL BEVEL WITH SAFETY (10/CA)

## (undated) DEVICE — SENSOR SPO2 NEO LNCS ADHESIVE (20/BX) SEE USER NOTES

## (undated) DEVICE — SET LEADWIRE 5 LEAD BEDSIDE DISPOSABLE ECG (1SET OF 5/EA)

## (undated) DEVICE — LACTATED RINGERS INJ 1000 ML - (14EA/CA 60CA/PF)

## (undated) DEVICE — Device

## (undated) DEVICE — TIP POLYMER I&A

## (undated) DEVICE — PACK BASIC CATARACT - (4/BX)